# Patient Record
Sex: FEMALE | Race: WHITE | Employment: UNEMPLOYED | URBAN - NONMETROPOLITAN AREA
[De-identification: names, ages, dates, MRNs, and addresses within clinical notes are randomized per-mention and may not be internally consistent; named-entity substitution may affect disease eponyms.]

---

## 2017-10-31 ENCOUNTER — OFFICE VISIT (OUTPATIENT)
Dept: GASTROENTEROLOGY | Age: 57
End: 2017-10-31
Payer: MEDICAID

## 2017-10-31 VITALS
HEART RATE: 95 BPM | OXYGEN SATURATION: 98 % | HEIGHT: 69 IN | WEIGHT: 182.8 LBS | DIASTOLIC BLOOD PRESSURE: 70 MMHG | BODY MASS INDEX: 27.08 KG/M2 | SYSTOLIC BLOOD PRESSURE: 125 MMHG

## 2017-10-31 DIAGNOSIS — B18.2 CHRONIC HEPATITIS C WITHOUT HEPATIC COMA (HCC): Primary | ICD-10-CM

## 2017-10-31 PROCEDURE — G8482 FLU IMMUNIZE ORDER/ADMIN: HCPCS | Performed by: INTERNAL MEDICINE

## 2017-10-31 PROCEDURE — 1036F TOBACCO NON-USER: CPT | Performed by: INTERNAL MEDICINE

## 2017-10-31 PROCEDURE — 3017F COLORECTAL CA SCREEN DOC REV: CPT | Performed by: INTERNAL MEDICINE

## 2017-10-31 PROCEDURE — G8419 CALC BMI OUT NRM PARAM NOF/U: HCPCS | Performed by: INTERNAL MEDICINE

## 2017-10-31 PROCEDURE — 99213 OFFICE O/P EST LOW 20 MIN: CPT | Performed by: INTERNAL MEDICINE

## 2017-10-31 PROCEDURE — G8427 DOCREV CUR MEDS BY ELIG CLIN: HCPCS | Performed by: INTERNAL MEDICINE

## 2017-10-31 PROCEDURE — 3014F SCREEN MAMMO DOC REV: CPT | Performed by: INTERNAL MEDICINE

## 2017-10-31 RX ORDER — ALPRAZOLAM 0.5 MG/1
TABLET ORAL
COMMUNITY
Start: 2017-10-23 | End: 2019-10-04

## 2017-10-31 RX ORDER — GABAPENTIN 300 MG/1
CAPSULE ORAL
COMMUNITY
Start: 2017-10-04 | End: 2019-10-04

## 2017-10-31 RX ORDER — PHENOL 1.4 %
1 AEROSOL, SPRAY (ML) MUCOUS MEMBRANE DAILY
COMMUNITY
End: 2021-08-31 | Stop reason: CLARIF

## 2017-10-31 RX ORDER — OMEGA-3S/DHA/EPA/FISH OIL/D3 300MG-1000
400 CAPSULE ORAL DAILY
COMMUNITY
End: 2019-10-04

## 2017-10-31 ASSESSMENT — ENCOUNTER SYMPTOMS
NAUSEA: 0
VOMITING: 0
CONSTIPATION: 0
ABDOMINAL PAIN: 0
GASTROINTESTINAL NEGATIVE: 1
RECTAL PAIN: 0
ABDOMINAL DISTENTION: 0
ANAL BLEEDING: 0
COUGH: 0
RESPIRATORY NEGATIVE: 1
BLOOD IN STOOL: 0
TROUBLE SWALLOWING: 0
DIARRHEA: 0

## 2017-10-31 NOTE — PROGRESS NOTES
tablet, Take 10 mg by mouth 3 times daily as needed for Muscle spasms, Disp: , Rfl:     Other and Phenergan [promethazine hcl]    Family History   Problem Relation Age of Onset    Diabetes Maternal Grandmother     Cancer Maternal Grandmother     Colon Cancer Neg Hx     Colon Polyps Neg Hx     Esophageal Cancer Neg Hx     Liver Cancer Neg Hx     Liver Disease Neg Hx     Rectal Cancer Neg Hx     Stomach Cancer Neg Hx        Social History     Social History    Marital status:      Spouse name: N/A    Number of children: N/A    Years of education: N/A     Occupational History    Not on file. Social History Main Topics    Smoking status: Former Smoker     Quit date: 10/1/2010    Smokeless tobacco: Never Used    Alcohol use Yes      Comment: social    Drug use: No    Sexual activity: Not on file     Other Topics Concern    Not on file     Social History Narrative    No narrative on file         Review of Systems   Constitutional: Negative. Negative for chills, fever and unexpected weight change. HENT: Negative for trouble swallowing. Respiratory: Negative. Negative for cough. Cardiovascular: Negative. Negative for chest pain and palpitations. Gastrointestinal: Negative. Negative for abdominal distention, abdominal pain, anal bleeding, blood in stool, constipation, diarrhea, nausea, rectal pain and vomiting. Skin: Negative. Negative for rash. Allergic/Immunologic: Negative for food allergies. Neurological: Negative for seizures. Psychiatric/Behavioral: Negative. The patient is not nervous/anxious. Objective:   Physical Exam   Constitutional: She is oriented to person, place, and time. She appears well-developed and well-nourished. HENT:   Head: Normocephalic and atraumatic. Eyes: Pupils are equal, round, and reactive to light. No scleral icterus. Neck: Normal range of motion. Neck supple.    Cardiovascular: Normal rate, regular rhythm and normal heart

## 2017-11-02 ENCOUNTER — TELEPHONE (OUTPATIENT)
Dept: GASTROENTEROLOGY | Age: 57
End: 2017-11-02

## 2017-11-02 DIAGNOSIS — B18.2 CHRONIC HEPATITIS C WITHOUT HEPATIC COMA (HCC): Primary | ICD-10-CM

## 2017-11-02 NOTE — TELEPHONE ENCOUNTER
I called pt to ask if she had a certain day and time she would like when I called to schedule her liver u/s.   I left a message on her answering machine to call me back when she could

## 2017-11-08 NOTE — TELEPHONE ENCOUNTER
Wai Wood is returning a call to L. Frankey Anis please call her at [phone]. This is regarding scheduling her Liver US.  Chemo warren

## 2017-11-09 ENCOUNTER — TELEPHONE (OUTPATIENT)
Dept: GASTROENTEROLOGY | Age: 57
End: 2017-11-09

## 2017-11-09 NOTE — TELEPHONE ENCOUNTER
11/9/17 Patient is scheduled for the  Liver on Friday 11/17/17 @ 9:30 at [facility].

## 2017-12-08 ENCOUNTER — TELEPHONE (OUTPATIENT)
Dept: GASTROENTEROLOGY | Age: 57
End: 2017-12-08

## 2017-12-19 ENCOUNTER — TELEPHONE (OUTPATIENT)
Dept: GASTROENTEROLOGY | Age: 57
End: 2017-12-19

## 2017-12-19 NOTE — TELEPHONE ENCOUNTER
Message for Norbert    12-19-17 1:45  Carmen Rollins with [facility] called asking for a return call [phone] to discuss an appeal on the patient's Hepatitis C medication.   kelvin

## 2017-12-26 ENCOUNTER — TELEPHONE (OUTPATIENT)
Dept: GASTROENTEROLOGY | Age: 57
End: 2017-12-26

## 2018-01-03 ENCOUNTER — TELEPHONE (OUTPATIENT)
Dept: GASTROENTEROLOGY | Age: 58
End: 2018-01-03

## 2018-01-11 ENCOUNTER — TELEPHONE (OUTPATIENT)
Dept: GASTROENTEROLOGY | Age: 58
End: 2018-01-11

## 2018-01-22 ENCOUNTER — TELEPHONE (OUTPATIENT)
Dept: GASTROENTEROLOGY | Age: 58
End: 2018-01-22

## 2018-02-08 ENCOUNTER — TELEPHONE (OUTPATIENT)
Dept: GASTROENTEROLOGY | Age: 58
End: 2018-02-08

## 2019-10-04 ENCOUNTER — OFFICE VISIT (OUTPATIENT)
Dept: GASTROENTEROLOGY | Age: 59
End: 2019-10-04

## 2019-10-04 VITALS
HEART RATE: 73 BPM | SYSTOLIC BLOOD PRESSURE: 138 MMHG | HEIGHT: 69 IN | BODY MASS INDEX: 23.67 KG/M2 | DIASTOLIC BLOOD PRESSURE: 76 MMHG | WEIGHT: 159.8 LBS | OXYGEN SATURATION: 98 %

## 2019-10-04 DIAGNOSIS — Z12.11 COLON CANCER SCREENING: Primary | ICD-10-CM

## 2019-10-04 PROCEDURE — 99999 PR OFFICE/OUTPT VISIT,PROCEDURE ONLY: CPT | Performed by: NURSE PRACTITIONER

## 2019-10-04 RX ORDER — ONDANSETRON 4 MG/1
4 TABLET, ORALLY DISINTEGRATING ORAL PRN
COMMUNITY
End: 2021-08-31 | Stop reason: CLARIF

## 2019-10-04 RX ORDER — LAMOTRIGINE 200 MG/1
100 TABLET ORAL 2 TIMES DAILY
COMMUNITY

## 2019-10-04 RX ORDER — M-VIT,TX,IRON,MINS/CALC/FOLIC 27MG-0.4MG
1 TABLET ORAL DAILY
COMMUNITY
End: 2021-08-31 | Stop reason: CLARIF

## 2019-10-04 RX ORDER — VERAPAMIL HYDROCHLORIDE 300 MG/1
300 CAPSULE, EXTENDED RELEASE ORAL DAILY
COMMUNITY
End: 2021-08-31 | Stop reason: CLARIF

## 2019-10-04 ASSESSMENT — ENCOUNTER SYMPTOMS
CHOKING: 0
ABDOMINAL PAIN: 1
COUGH: 0
TROUBLE SWALLOWING: 0
NAUSEA: 1
SHORTNESS OF BREATH: 0
BLOOD IN STOOL: 0
CONSTIPATION: 0
ANAL BLEEDING: 0
RECTAL PAIN: 0
ABDOMINAL DISTENTION: 0
DIARRHEA: 1
VOMITING: 0

## 2019-10-18 ENCOUNTER — HOSPITAL ENCOUNTER (OUTPATIENT)
Age: 59
Setting detail: OUTPATIENT SURGERY
Discharge: HOME | End: 2019-10-18
Attending: INTERNAL MEDICINE | Admitting: INTERNAL MEDICINE
Payer: MEDICAID

## 2019-10-18 ENCOUNTER — APPOINTMENT (OUTPATIENT)
Dept: OPERATING ROOM | Age: 59
End: 2019-10-18

## 2019-10-18 ENCOUNTER — ANESTHESIA (OUTPATIENT)
Dept: OPERATING ROOM | Age: 59
End: 2019-10-18

## 2019-10-18 ENCOUNTER — ANESTHESIA EVENT (OUTPATIENT)
Dept: OPERATING ROOM | Age: 59
End: 2019-10-18

## 2019-10-18 VITALS
WEIGHT: 157 LBS | OXYGEN SATURATION: 98 % | SYSTOLIC BLOOD PRESSURE: 112 MMHG | RESPIRATION RATE: 16 BRPM | DIASTOLIC BLOOD PRESSURE: 65 MMHG | BODY MASS INDEX: 23.25 KG/M2 | HEIGHT: 69 IN | HEART RATE: 78 BPM

## 2019-10-18 VITALS — SYSTOLIC BLOOD PRESSURE: 100 MMHG | OXYGEN SATURATION: 96 % | DIASTOLIC BLOOD PRESSURE: 53 MMHG

## 2019-10-18 PROCEDURE — G0121 COLON CA SCRN NOT HI RSK IND: HCPCS

## 2019-10-18 PROCEDURE — G8907 PT DOC NO EVENTS ON DISCHARG: HCPCS

## 2019-10-18 PROCEDURE — G8918 PT W/O PREOP ORDER IV AB PRO: HCPCS

## 2019-10-18 PROCEDURE — 45378 DIAGNOSTIC COLONOSCOPY: CPT | Performed by: INTERNAL MEDICINE

## 2019-10-18 RX ORDER — SODIUM CHLORIDE 9 MG/ML
INJECTION, SOLUTION INTRAVENOUS CONTINUOUS
Status: DISCONTINUED | OUTPATIENT
Start: 2019-10-18 | End: 2019-10-18 | Stop reason: HOSPADM

## 2019-10-18 RX ORDER — PROPOFOL 10 MG/ML
INJECTION, EMULSION INTRAVENOUS PRN
Status: DISCONTINUED | OUTPATIENT
Start: 2019-10-18 | End: 2019-10-18 | Stop reason: SDUPTHER

## 2019-10-18 RX ORDER — LIDOCAINE HYDROCHLORIDE 10 MG/ML
INJECTION, SOLUTION INFILTRATION; PERINEURAL PRN
Status: DISCONTINUED | OUTPATIENT
Start: 2019-10-18 | End: 2019-10-18 | Stop reason: SDUPTHER

## 2019-10-18 RX ORDER — SODIUM CHLORIDE 9 MG/ML
INJECTION, SOLUTION INTRAVENOUS CONTINUOUS PRN
Status: DISCONTINUED | OUTPATIENT
Start: 2019-10-18 | End: 2019-10-18 | Stop reason: SDUPTHER

## 2019-10-18 RX ADMIN — SODIUM CHLORIDE: 9 INJECTION, SOLUTION INTRAVENOUS at 15:37

## 2019-10-18 RX ADMIN — PROPOFOL 250 MG: 10 INJECTION, EMULSION INTRAVENOUS at 15:41

## 2019-10-18 RX ADMIN — SODIUM CHLORIDE: 9 INJECTION, SOLUTION INTRAVENOUS at 14:26

## 2019-10-18 RX ADMIN — LIDOCAINE HYDROCHLORIDE 40 MG: 10 INJECTION, SOLUTION INFILTRATION; PERINEURAL at 15:41

## 2019-10-18 ASSESSMENT — PAIN SCALES - GENERAL
PAINLEVEL_OUTOF10: 0
PAINLEVEL_OUTOF10: 0

## 2021-08-04 ENCOUNTER — TELEPHONE (OUTPATIENT)
Dept: NEUROLOGY | Age: 61
End: 2021-08-04

## 2021-08-31 ENCOUNTER — TELEPHONE (OUTPATIENT)
Dept: NEUROLOGY | Age: 61
End: 2021-08-31

## 2021-08-31 ENCOUNTER — OFFICE VISIT (OUTPATIENT)
Dept: NEUROLOGY | Age: 61
End: 2021-08-31
Payer: MEDICAID

## 2021-08-31 VITALS
SYSTOLIC BLOOD PRESSURE: 134 MMHG | HEIGHT: 68 IN | DIASTOLIC BLOOD PRESSURE: 81 MMHG | BODY MASS INDEX: 21.37 KG/M2 | WEIGHT: 141 LBS | HEART RATE: 103 BPM

## 2021-08-31 DIAGNOSIS — M54.2 PAIN, NECK: ICD-10-CM

## 2021-08-31 DIAGNOSIS — R20.0 NUMBNESS: Primary | ICD-10-CM

## 2021-08-31 DIAGNOSIS — M79.661 PAIN IN BOTH LOWER LEGS: ICD-10-CM

## 2021-08-31 DIAGNOSIS — M79.662 PAIN IN BOTH LOWER LEGS: ICD-10-CM

## 2021-08-31 PROCEDURE — 3017F COLORECTAL CA SCREEN DOC REV: CPT | Performed by: PSYCHIATRY & NEUROLOGY

## 2021-08-31 PROCEDURE — G8420 CALC BMI NORM PARAMETERS: HCPCS | Performed by: PSYCHIATRY & NEUROLOGY

## 2021-08-31 PROCEDURE — 99204 OFFICE O/P NEW MOD 45 MIN: CPT | Performed by: PSYCHIATRY & NEUROLOGY

## 2021-08-31 PROCEDURE — G8427 DOCREV CUR MEDS BY ELIG CLIN: HCPCS | Performed by: PSYCHIATRY & NEUROLOGY

## 2021-08-31 PROCEDURE — 1036F TOBACCO NON-USER: CPT | Performed by: PSYCHIATRY & NEUROLOGY

## 2021-08-31 RX ORDER — SENNOSIDES 8.6 MG
650 CAPSULE ORAL EVERY 8 HOURS PRN
COMMUNITY

## 2021-08-31 RX ORDER — GLUCOSAMINE/D3/BOSWELLIA SERRA 1500MG-400
1 TABLET ORAL DAILY
COMMUNITY

## 2021-08-31 RX ORDER — CETIRIZINE HYDROCHLORIDE 10 MG/1
1 TABLET ORAL DAILY
COMMUNITY
Start: 2021-08-09

## 2021-08-31 RX ORDER — LOSARTAN POTASSIUM 50 MG/1
1 TABLET ORAL DAILY
COMMUNITY
Start: 2021-08-15

## 2021-08-31 RX ORDER — AMLODIPINE BESYLATE 5 MG/1
1 TABLET ORAL DAILY
COMMUNITY
Start: 2021-06-13

## 2021-08-31 NOTE — TELEPHONE ENCOUNTER
Called spoke with patient about her nerve conduction test, patient is aware of the appointment  And location.

## 2021-08-31 NOTE — PROGRESS NOTES
Chief Complaint   Patient presents with    Consultation     New patient referral from [address] [place] [address] to evaluate patient with numbness and tingling in bilat upper extremities, right lower extremity, and intermittently on the left lower extremity. Patient had MRI's completed at [facility], she brought that disc for review today. Brynn Calhoun is a 59y.o. year old female who is seen for evaluation of numbness in the arms and pain in the legs The patient indicates that over the last one year she has notice numbness in her medial forearm and last finger, first in the left hand and then in the right. She denies any pain or weakness in the arms. She feels this has moved up her arm. She does have some mild neck pain but nothing significant. She denies any neck injuries or motor vehicle accidents. She has some chronic numbness over the right lateral thigh. Over the last few months she has had some pain in the mid to lower thigh primarily on the right, often if she stands or walks. She is unclear how much her knee is involved but moving her knee does not cause her pain. She does have slow back pain. She denies any bowel or bladder incontinence or gait abnormalities. She had an MRI of the C spine which revealed multilevel degenerative changes with mass effect on the cord and thecal sac with no cord abnormalities. The MRI of the lumbarspine had only minimal degenerative changes.     Active Ambulatory Problems     Diagnosis Date Noted    Hepatitis C without hepatic coma 07/06/2015     Resolved Ambulatory Problems     Diagnosis Date Noted    No Resolved Ambulatory Problems     Past Medical History:   Diagnosis Date    Depression     Hepatitis C 2006    HTN (hypertension)     Osteoarthritis        Past Surgical History:   Procedure Laterality Date    CERVIX SURGERY      CHOLECYSTECTOMY, LAPAROSCOPIC      COLONOSCOPY  2008    PER PT \" NO POLYPS\"    COLONOSCOPY N/A 10/18/2019    Dr Loyda Pires, 10 y recall  FOOT SURGERY      right    LIVER BIOPSY  08/11/2015    DR VILLAVICENCIO:  SCHEUER'S SCORE INFLAMMATION EQUALS 1, SCHEUER'S STAGE FIBROSIS EQUALS 0, NEGATIVE FOR INCREASED STAINABLE IRON, NEGATIVE FOR EVIDENCE OF MALIGNANCY OR CIRRHOSIS.  UPPER GASTROINTESTINAL ENDOSCOPY  2016    Reba Maxim, normal per pt       Family History   Problem Relation Age of Onset    Diabetes Maternal Grandmother     Cancer Maternal Grandmother     Stomach Cancer Brother     Colon Cancer Neg Hx     Colon Polyps Neg Hx     Esophageal Cancer Neg Hx     Liver Cancer Neg Hx     Liver Disease Neg Hx     Rectal Cancer Neg Hx        Allergies   Allergen Reactions    Other      Antibotic name unknown to patient    Phenergan [Promethazine Hcl]        Social History     Socioeconomic History    Marital status:      Spouse name: Not on file    Number of children: Not on file    Years of education: Not on file    Highest education level: Not on file   Occupational History    Not on file   Tobacco Use    Smoking status: Former Smoker     Quit date: 10/1/2010     Years since quitting: 10.9    Smokeless tobacco: Never Used   Vaping Use    Vaping Use: Never used   Substance and Sexual Activity    Alcohol use: Yes     Comment: social    Drug use: No    Sexual activity: Not on file   Other Topics Concern    Not on file   Social History Narrative    Not on file     Social Determinants of Health     Financial Resource Strain:     Difficulty of Paying Living Expenses:    Food Insecurity:     Worried About Running Out of Food in the Last Year:     Ran Out of Food in the Last Year:    Transportation Needs:     Lack of Transportation (Medical):      Lack of Transportation (Non-Medical):    Physical Activity:     Days of Exercise per Week:     Minutes of Exercise per Session:    Stress:     Feeling of Stress :    Social Connections:     Frequency of Communication with Friends and Family:     Frequency of Social No scars, masses, or lesions over external nose or ears, no atrophy of tongue  Neck-symmetric, no masses noted, no jugular vein distension  Respiration- chest wall appears symmetric, good expansion,   normal effort without use of accessory muscles  Musculoskeletal - no significant wasting of muscles noted, no bony deformities  Extremities-no clubbing, cyanosis or edema  Skin - warm, dry, and intact. No rash, erythema, or pallor.   Psychiatric - mood, affect, and behavior appear normal.      Neurological exam  Awake, alert, fluent oriented x 3 appropriate affect  Attention and concentration appear appropriate  Recent and remote memory appears unremarkable  Speech normal without dysarthria  No clear issues with language of fund of knowledge    Cranial Nerve Exam   CN II- Visual fields grossly unremarkable  CN III, IV,VI-EOMI, No nystagmus, conjugate eye movements, no ptosis  CN V-sensation intact to LT over face  CN VII-no facial assymetry  CN VIII-Hearing intact to finger rub  CN IX and X- Palate not tested  CN XI-not test shoulder shrug  CN XII-Tongue midline with no fasciculations or fibrillations    Motor Exam  V/V throughout upper and lower extremities bilaterally, no cogwheeling, normal tone    Sensory Exam  Sensation intact to light touch and temperature upper and lower extremities bilaterally  Except right lateral thigh and last finger or both hands    Reflexes   2+ biceps bilaterally  2+ brachioradialis  2+patella  0 ankle jerks  No clonus ankles  No Montejo's sign bilateral hands    Tremors- no tremors in hands or head noted    Gait  Normal base and speed  No ataxia    Coordination  Finger to nose-unremarkable    No results found for: [id number]  Lab Results   Component Value Date    WBC 6.7 10/31/2017    HGB 12.5 10/31/2017    HCT 37.1 10/31/2017    MCV 84 10/31/2017     10/31/2017     Lab Results   Component Value Date     10/31/2017    K 4.1 10/31/2017     10/31/2017    CO2 23 10/31/2017    BUN 15 10/31/2017    CREATININE 0.78 10/31/2017    GLUCOSE 96 10/31/2017    CALCIUM 9.2 10/31/2017    PROT 7.0 10/31/2017    LABALBU 4.6 10/31/2017    BILITOT 0.3 10/31/2017    BILITOT 0.4 10/31/2017    ALKPHOS 99 10/31/2017    AST 23 10/31/2017    ALT 29 10/31/2017    ALT 25 10/31/2017           Assessment    ICD-10-CM    1. Numbness  R20.0 [id number] [facility], [place]     Nerve Conduction Test with EMG   2. Pain in both lower legs  M79.661 [facility]    E59.848 Nerve Conduction Test with EMG   3. 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 She has some subjective altered sensation over the last finger of both hands and possibly medial forearms. She has no evidence of weakness, hyperreflexia or pathological reflexes. Her gait was unremarkable. Based upon her history and examination it is unclear what role her cervical MRI findings are related to her symptoms. She has no clear evidence of a myelopathy. The sensory abnormalities in the hands could be suggestive of ulnar neuropathies. Her discomfort in her right anterior through could result from a neuralgia from the sensory branch of the femoral nerve or some musculoskeletal etiology. At this time she will be scheduled for an EMG with nerve conduction study of the right leg and arms. She will also be referred to our neurosurgical group for evaluation. The patient indicated understanding of the management plan. She is to follow up with me at the time of the nerve study to determine what further management is warranted. Plan    Orders Placed This Encounter   Procedures   [facility], [place]    Nerve Conduction Test with EMG       No orders of the defined types were placed in this encounter. Return if symptoms worsen or fail to improve.       EMR Dragon/transcription disclaimer:Significant part of this  encounter note is electronic transcription/translation of spoken language to printed text. The electronic translation of spoken language may be erroneous, or at times, nonsensical words or phrases may be inadvertently transcribed.  Although I have reviewed the note for such errors, some may still exist.

## 2021-09-02 ENCOUNTER — TELEPHONE (OUTPATIENT)
Dept: NEUROSURGERY | Age: 61
End: 2021-09-02

## 2021-09-02 NOTE — TELEPHONE ENCOUNTER
1st attempt to call patient to schedule appointment left voicemail with call back number [phone]

## 2021-09-07 ENCOUNTER — TELEPHONE (OUTPATIENT)
Dept: NEUROSURGERY | Age: 61
End: 2021-09-07

## 2021-09-07 NOTE — TELEPHONE ENCOUNTER
2nd attempt to call patient to schedule appointment, patient stated her mother just passed away 9-6-21 and will call back to schedule appointment at another time

## 2021-10-11 ENCOUNTER — HOSPITAL ENCOUNTER (OUTPATIENT)
Dept: NEUROLOGY | Age: 61
Discharge: HOME | End: 2021-10-11
Payer: MEDICAID

## 2021-10-11 DIAGNOSIS — M79.661 PAIN IN BOTH LOWER LEGS: ICD-10-CM

## 2021-10-11 DIAGNOSIS — M79.662 PAIN IN BOTH LOWER LEGS: ICD-10-CM

## 2021-10-11 DIAGNOSIS — R20.0 NUMBNESS: ICD-10-CM

## 2021-10-11 PROCEDURE — 95910 NRV CNDJ TEST 7-8 STUDIES: CPT | Performed by: PSYCHIATRY & NEUROLOGY

## 2021-10-11 PROCEDURE — 95886 MUSC TEST DONE W/N TEST COMP: CPT

## 2021-10-11 PROCEDURE — 95910 NRV CNDJ TEST 7-8 STUDIES: CPT

## 2021-10-11 PROCEDURE — 95886 MUSC TEST DONE W/N TEST COMP: CPT | Performed by: PSYCHIATRY & NEUROLOGY

## 2021-11-16 ENCOUNTER — TELEPHONE (OUTPATIENT)
Dept: NEUROSURGERY | Age: 61
End: 2021-11-16

## 2023-11-09 ENCOUNTER — PRE-ADMISSION TESTING (OUTPATIENT)
Dept: PREADMISSION TESTING | Facility: HOSPITAL | Age: 63
End: 2023-11-09
Payer: MEDICARE

## 2023-11-09 ENCOUNTER — HOSPITAL ENCOUNTER (OUTPATIENT)
Dept: GENERAL RADIOLOGY | Facility: HOSPITAL | Age: 63
Discharge: HOME | End: 2023-11-09
Payer: MEDICARE

## 2023-11-09 VITALS
BODY MASS INDEX: 25.56 KG/M2 | RESPIRATION RATE: 18 BRPM | DIASTOLIC BLOOD PRESSURE: 67 MMHG | OXYGEN SATURATION: 96 % | SYSTOLIC BLOOD PRESSURE: 135 MMHG | HEART RATE: 92 BPM | HEIGHT: 62 IN | WEIGHT: 138.89 LBS

## 2023-11-09 LAB
ALBUMIN SERPL-MCNC: 4.6 G/DL (ref 3.5–5.2)
ALBUMIN/GLOB SERPL: 1.8 G/DL
ALP SERPL-CCNC: 116 U/L (ref 39–117)
ALT SERPL W P-5'-P-CCNC: 24 U/L (ref 1–33)
ANION GAP SERPL CALCULATED.3IONS-SCNC: 11 MMOL/L (ref 5–15)
APTT PPP: 28.3 SECONDS (ref 24.5–36)
AST SERPL-CCNC: 30 U/L (ref 1–32)
BILIRUB SERPL-MCNC: 0.4 MG/DL (ref 0–1.2)
BILIRUB UR QL STRIP: NEGATIVE
BUN SERPL-MCNC: 19 MG/DL (ref 8–23)
BUN/CREAT SERPL: 22.1 (ref 7–25)
CALCIUM SPEC-SCNC: 9.7 MG/DL (ref 8.6–10.5)
CHLORIDE SERPL-SCNC: 102 MMOL/L (ref 98–107)
CLARITY UR: CLEAR
CO2 SERPL-SCNC: 28 MMOL/L (ref 22–29)
COLOR UR: YELLOW
CREAT SERPL-MCNC: 0.86 MG/DL (ref 0.57–1)
DEPRECATED RDW RBC AUTO: 39 FL (ref 37–54)
EGFRCR SERPLBLD CKD-EPI 2021: 75.1 ML/MIN/1.73
ERYTHROCYTE [DISTWIDTH] IN BLOOD BY AUTOMATED COUNT: 12.1 % (ref 12.3–15.4)
GLOBULIN UR ELPH-MCNC: 2.6 GM/DL
GLUCOSE SERPL-MCNC: 111 MG/DL (ref 65–99)
GLUCOSE UR STRIP-MCNC: NEGATIVE MG/DL
HCT VFR BLD AUTO: 39.5 % (ref 34–46.6)
HGB BLD-MCNC: 12.7 G/DL (ref 12–15.9)
HGB UR QL STRIP.AUTO: NEGATIVE
INR PPP: 0.88 (ref 0.91–1.09)
KETONES UR QL STRIP: NEGATIVE
LEUKOCYTE ESTERASE UR QL STRIP.AUTO: NEGATIVE
MCH RBC QN AUTO: 28.2 PG (ref 26.6–33)
MCHC RBC AUTO-ENTMCNC: 32.2 G/DL (ref 31.5–35.7)
MCV RBC AUTO: 87.8 FL (ref 79–97)
NITRITE UR QL STRIP: NEGATIVE
PH UR STRIP.AUTO: 7.5 [PH] (ref 5–8)
PLATELET # BLD AUTO: 248 10*3/MM3 (ref 140–450)
PMV BLD AUTO: 10.4 FL (ref 6–12)
POTASSIUM SERPL-SCNC: 3.8 MMOL/L (ref 3.5–5.2)
PROT SERPL-MCNC: 7.2 G/DL (ref 6–8.5)
PROT UR QL STRIP: NEGATIVE
PROTHROMBIN TIME: 12 SECONDS (ref 11.8–14.8)
QT INTERVAL: 350 MS
QTC INTERVAL: 408 MS
RBC # BLD AUTO: 4.5 10*6/MM3 (ref 3.77–5.28)
SODIUM SERPL-SCNC: 141 MMOL/L (ref 136–145)
SP GR UR STRIP: 1.01 (ref 1–1.03)
UROBILINOGEN UR QL STRIP: NORMAL
WBC NRBC COR # BLD: 6.49 10*3/MM3 (ref 3.4–10.8)

## 2023-11-09 PROCEDURE — 93005 ELECTROCARDIOGRAM TRACING: CPT

## 2023-11-09 PROCEDURE — 85027 COMPLETE CBC AUTOMATED: CPT

## 2023-11-09 PROCEDURE — 81003 URINALYSIS AUTO W/O SCOPE: CPT

## 2023-11-09 PROCEDURE — 80053 COMPREHEN METABOLIC PANEL: CPT

## 2023-11-09 PROCEDURE — 71045 X-RAY EXAM CHEST 1 VIEW: CPT

## 2023-11-09 PROCEDURE — 85610 PROTHROMBIN TIME: CPT

## 2023-11-09 PROCEDURE — 85730 THROMBOPLASTIN TIME PARTIAL: CPT

## 2023-11-09 PROCEDURE — 36415 COLL VENOUS BLD VENIPUNCTURE: CPT

## 2023-11-09 RX ORDER — CETIRIZINE HYDROCHLORIDE 10 MG/1
10 TABLET ORAL DAILY
COMMUNITY

## 2023-11-09 RX ORDER — AMLODIPINE BESYLATE 5 MG/1
5 TABLET ORAL DAILY
COMMUNITY

## 2023-11-09 RX ORDER — GLUCOSAMINE/D3/BOSWELLIA SERRA 1500MG-400
1 TABLET ORAL DAILY
COMMUNITY

## 2023-11-09 RX ORDER — LAMOTRIGINE 200 MG/1
200 TABLET ORAL DAILY
COMMUNITY

## 2023-11-09 RX ORDER — MELOXICAM 15 MG/1
15 TABLET ORAL DAILY
COMMUNITY

## 2023-11-09 RX ORDER — LOSARTAN POTASSIUM 50 MG/1
50 TABLET ORAL DAILY
COMMUNITY

## 2023-11-09 RX ORDER — ROPINIROLE 0.5 MG/1
0.5 TABLET, FILM COATED ORAL NIGHTLY
COMMUNITY

## 2023-11-09 NOTE — DISCHARGE INSTRUCTIONS
Before you come to the hospital        Arrival time: AS DIRECTED BY OFFICE     YOU MAY TAKE THE FOLLOWING MEDICATION(S) THE MORNING OF SURGERY WITH A SIP OF WATER:    STOP LOSARTAN 24 HRS PRIOR TO SURGERY           ALL OTHER HOME MEDICATION CHECK WITH YOUR PHYSICIAN (especially if   you are taking diabetes medicines or blood thinners)        If you were given and instructed to use a germ- killing soap, use as directed the night before surgery and again the morning of surgery or as directed by your surgeon. (Use one-half of the bottle with each shower.)   See attached information for How to Use Chlorhexidine for Bathing if applicable.            Eating and drinking restrictions prior to scheduled arrival time    2 Hours before arrival time STOP   Drinking Clear liquids (water, black coffee-NO CREAM,  apple juice-no pulp)    Clear Liquids    Water and flavored water                                                                      Clear Fruit juices, such as cranberry juice and apple juice.  Black coffee (NO cream of any kind, including powdered).  Plain tea  Clear bouillon or broth.  Flavored gelatin.  Soda.  Gatorade or Powerade.    8 Hours before arrival time STOP   All food, full liquids, and dairy products  Full liquid examples  Juices that have pulp.  Frozen ice pops that contain fruit pieces.  Coffee with creamer  Milk.  Yogurt.    (It is extremely important that you follow these guidelines to prevent delay or cancelation of your procedure)                       MANAGING PAIN AFTER SURGERY    We know you are probably wondering what your pain will be like after surgery.  Following surgery it is unrealistic to expect you will not have pain.   Pain is how our bodies let us know that something is wrong or cautions us to be careful.  That said, our goal is to make your pain tolerable.    Methods we may use to treat your pain include (oral or IV medications, PCAs, epidurals, nerve blocks, etc.)   While some  procedures require IV pain medications for a short time after surgery, transitioning to pain medications by mouth allows for better management of pain.   Your nurse will encourage you to take oral pain medications whenever possible.  IV medications work almost immediately, but only last a short while.  Taking medications by mouth allows for a more constant level of medication in your blood stream for a longer period of time.      Once your pain is out of control it is harder to get back under control.  It is important you are aware when your next dose of pain medication is due.  If you are admitted, your nurse may write the time of your next dose on the white board in your room to help you remember.      We are interested in your pain and encourage you to inform us about aggravating factors during your visit.   Many times a simple repositioning every few hours can make a big difference.    If your physician says it is okay, do not let your pain prevent you from getting out of bed. Be sure to call your nurse for assistance prior to getting up so you do not fall.      Before surgery, please decide your tolerable pain goal.  These faces help describe the pain ratings we use on a 0-10 scale.   Be prepared to tell us your goal and whether or not you take pain or anxiety medications at home.          Preparing for Surgery  Preparing for surgery is an important part of your care. It can make things go more smoothly and help you avoid complications. The steps leading up to surgery may vary among hospitals. Follow all instructions given to you by your health care providers. Ask questions if you do not understand something. Talk about any concerns that you have.  Here are some questions to consider asking before your surgery:  If my surgery is not an emergency (is elective), when would be the best time to have the surgery?  What arrangements do I need to make for work, home, or school?  What will my recovery be like? How long  will it be before I can return to normal activities?  Will I need to prepare my home? Will I need to arrange care for me or my children?  Should I expect to have pain after surgery? What are my pain management options? Are there nonmedical options that I can try for pain?  Tell a health care provider about:  Any allergies you have.  All medicines you are taking, including vitamins, herbs, eye drops, creams, and over-the-counter medicines.  Any problems you or family members have had with anesthetic medicines.  Any blood disorders you have.  Any surgeries you have had.  Any medical conditions you have.  Whether you are pregnant or may be pregnant.  What are the risks?  The risks and complications of surgery depend on the specific procedure that you have. Discuss all the risks with your health care providers before your surgery. Ask about common surgical complications, which may include:  Infection.  Bleeding or a need for blood replacement (transfusion).  Allergic reactions to medicines.  Damage to surrounding nerves, tissues, or structures.  A blood clot.  Scarring.  Failure of the surgery to correct the problem.  Follow these instructions before the procedure:  Several days or weeks before your procedure  You may have a physical exam by your primary health care provider to make sure it is safe for you to have surgery.  You may have testing. This may include a chest X-ray, blood and urine tests, electrocardiogram (ECG), or other testing.  Ask your health care provider about:  Changing or stopping your regular medicines. This is especially important if you are taking diabetes medicines or blood thinners.  Taking medicines such as aspirin and ibuprofen. These medicines can thin your blood. Do not take these medicines unless your health care provider tells you to take them.  Taking over-the-counter medicines, vitamins, herbs, and supplements.  Do not use any products that contain nicotine or tobacco, such as cigarettes  and e-cigarettes. If you need help quitting, ask your health care provider.  Avoid alcohol.  Ask your health care provider if there are exercises you can do to prepare for surgery.  Eat a healthy diet.   Plan to have someone 18 years of age or older to take you home from the hospital. We will need to verify your ride on the morning of surgery if you are being discharged home on the same day. Tell your ride to be expecting a call from the hospital prior to your procedure.   Plan to have a responsible adult care for you for at least 24 hours after you leave the hospital or clinic. This is important.  The day before your procedure  You may be given antibiotic medicine to take by mouth to help prevent infection. Take it as told by your health care provider.  You may be asked to shower with a germ-killing soap.  Follow instructions from your health care provider about eating and drinking restrictions. This includes gum, mints and hard candy.  Pack comfortable clothes according to your procedure.   The day of your procedure  You may need to take another shower with a germ-killing soap before you leave home in the morning.  With a small sip of water, take only the medicines that you are told to take.  Remove all jewelry including rings.   Leave anything you consider valuable at home except hearing aids if needed.  You do not need to bring your home medications into the hospital.   Do not wear any makeup, nail polish, powder, deodorant, lotion, hair accessories, or anything on your skin or body except your clothes.  If you will be staying in the hospital, bring a case to hold your glasses, contacts, or dentures. You may also want to bring your robe and non-skid footwear.       (Do not use denture adhesives since you will be asked to remove them during  surgery).   If you wear oxygen at home, bring it with you the day of surgery.  If instructed by your health care provider, bring your sleep apnea device with you on the day  of your surgery (if this applies to you).  You may want to leave your suitcase and sleep apnea device in the car until after surgery.   Arrive at the hospital as scheduled.  Bring a friend or family member with you who can help to answer questions and be present while you meet with your health care provider.  At the hospital  When you arrive at the hospital:  Go to registration located at the main entrance of the hospital. You will be registered and given a beeper and a sticker sheet. Take the stickers to the Outpatient nurses desk and place in the black tray. This is to notify staff that you have arrived. Then return to the lobby to wait.   When your beeper lights up and vibrates proceed through the double doors, under the stairs, and a member of the Outpatient Surgery staff will escort you to your preoperative room.  You may have to wear compression sleeves. These help to prevent blood clots and reduce swelling in your legs.  An IV may be inserted into one of your veins.              In the operating room, you may be given one or more of the following:        A medicine to help you relax (sedative).        A medicine to numb the area (local anesthetic).        A medicine to make you fall asleep (general anesthetic).        A medicine that is injected into an area of your body to numb everything below the                      injection site (regional anesthetic).  You may be given an antibiotic through your IV to help prevent infection.  Your surgical site will be marked or identified.    Contact a health care provider if you:  Develop a fever of more than 100.4°F (38°C) or other feelings of illness during the 48 hours before your surgery.  Have symptoms that get worse.  Have questions or concerns about your surgery.  Summary  Preparing for surgery can make the procedure go more smoothly and lower your risk of complications.  Before surgery, make a list of questions and concerns to discuss with your surgeon. Ask  about the risks and possible complications.  In the days or weeks before your surgery, follow all instructions from your health care provider. You may need to stop smoking, avoid alcohol, follow eating restrictions, and change or stop your regular medicines.  Contact your surgeon if you develop a fever or other signs of illness during the few days before your surgery.  This information is not intended to replace advice given to you by your health care provider. Make sure you discuss any questions you have with your health care provider.  Document Revised: 12/21/2018 Document Reviewed: 10/23/2018  [employer] Patient Education © 2021 [employer]         How to Use Chlorhexidine Before Surgery  Chlorhexidine gluconate (CHG) is a germ-killing (antiseptic) solution that is used to clean the skin. It can get rid of the bacteria that normally live on the skin and can keep them away for about 24 hours. To clean your skin with CHG, you may be given:  A CHG solution to use in the shower or as part of a sponge bath.  A prepackaged cloth that contains CHG.  Cleaning your skin with CHG may help lower the risk for infection:  While you are staying in the intensive care unit of the hospital.  If you have a vascular access, such as a central line, to provide short-term or long-term access to your veins.  If you have a catheter to drain urine from your bladder.  If you are on a ventilator. A ventilator is a machine that helps you breathe by moving air in and out of your lungs.  After surgery.  What are the risks?  Risks of using CHG include:  A skin reaction.  Hearing loss, if CHG gets in your ears and you have a perforated eardrum.  Eye injury, if CHG gets in your eyes and is not rinsed out.  The CHG product catching fire.  Make sure that you avoid smoking and flames after applying CHG to your skin.  Do not use CHG:  If you have a chlorhexidine allergy or have previously reacted to chlorhexidine.  On babies younger than 2 months of  age.  How to use CHG solution  Use CHG only as told by your health care provider, and follow the instructions on the label.  Use the full amount of CHG as directed. Usually, this is one bottle.  During a shower    Follow these steps when using CHG solution during a shower (unless your health care provider gives you different instructions):  Start the shower.  Use your normal soap and shampoo to wash your face and hair.  Turn off the shower or move out of the shower stream.  Pour the CHG onto a clean washcloth. Do not use any type of brush or rough-edged sponge.  Starting at your neck, lather your body down to your toes. Make sure you follow these instructions:  If you will be having surgery, pay special attention to the part of your body where you will be having surgery. Scrub this area for at least 1 minute.  Do not use CHG on your head or face. If the solution gets into your ears or eyes, rinse them well with water.  Avoid your genital area.  Avoid any areas of skin that have broken skin, cuts, or scrapes.  Scrub your back and under your arms. Make sure to wash skin folds.  Let the lather sit on your skin for 1-2 minutes or as long as told by your health care provider.  Thoroughly rinse your entire body in the shower. Make sure that all body creases and crevices are rinsed well.  Dry off with a clean towel. Do not put any substances on your body afterward--such as powder, lotion, or perfume--unless you are told to do so by your health care provider. Only use lotions that are recommended by the .  Put on clean clothes or pajamas.  If it is the night before your surgery, sleep in clean sheets.     During a sponge bath  Follow these steps when using CHG solution during a sponge bath (unless your health care provider gives you different instructions):  Use your normal soap and shampoo to wash your face and hair.  Pour the CHG onto a clean washcloth.  Starting at your neck, lather your body down to your  toes. Make sure you follow these instructions:  If you will be having surgery, pay special attention to the part of your body where you will be having surgery. Scrub this area for at least 1 minute.  Do not use CHG on your head or face. If the solution gets into your ears or eyes, rinse them well with water.  Avoid your genital area.  Avoid any areas of skin that have broken skin, cuts, or scrapes.  Scrub your back and under your arms. Make sure to wash skin folds.  Let the lather sit on your skin for 1-2 minutes or as long as told by your health care provider.  Using a different clean, wet washcloth, thoroughly rinse your entire body. Make sure that all body creases and crevices are rinsed well.  Dry off with a clean towel. Do not put any substances on your body afterward--such as powder, lotion, or perfume--unless you are told to do so by your health care provider. Only use lotions that are recommended by the .  Put on clean clothes or pajamas.  If it is the night before your surgery, sleep in clean sheets.  How to use CHG prepackaged cloths  Only use CHG cloths as told by your health care provider, and follow the instructions on the label.  Use the CHG cloth on clean, dry skin.  Do not use the CHG cloth on your head or face unless your health care provider tells you to.  When washing with the CHG cloth:  Avoid your genital area.  Avoid any areas of skin that have broken skin, cuts, or scrapes.  Before surgery    Follow these steps when using a CHG cloth to clean before surgery (unless your health care provider gives you different instructions):  Using the CHG cloth, vigorously scrub the part of your body where you will be having surgery. Scrub using a back-and-forth motion for 3 minutes. The area on your body should be completely wet with CHG when you are done scrubbing.  Do not rinse. Discard the cloth and let the area air-dry. Do not put any substances on the area afterward, such as powder, lotion,  or perfume.  Put on clean clothes or pajamas.  If it is the night before your surgery, sleep in clean sheets.     For general bathing  Follow these steps when using CHG cloths for general bathing (unless your health care provider gives you different instructions).  Use a separate CHG cloth for each area of your body. Make sure you wash between any folds of skin and between your fingers and toes. Wash your body in the following order, switching to a new cloth after each step:  The front of your neck, shoulders, and chest.  Both of your arms, under your arms, and your hands.  Your stomach and groin area, avoiding the genitals.  Your right leg and foot.  Your left leg and foot.  The back of your neck, your back, and your buttocks.  Do not rinse. Discard the cloth and let the area air-dry. Do not put any substances on your body afterward--such as powder, lotion, or perfume--unless you are told to do so by your health care provider. Only use lotions that are recommended by the .  Put on clean clothes or pajamas.  Contact a health care provider if:  Your skin gets irritated after scrubbing.  You have questions about using your solution or cloth.  You swallow any chlorhexidine. Call your local poison control center (1-907.813.7557 in the [place]  Get help right away if:  Your eyes itch badly, or they become very red or swollen.  Your skin itches badly and is red or swollen.  Your hearing changes.  You have trouble seeing.  You have swelling or tingling in your mouth or throat.  You have trouble breathing.  These symptoms may represent a serious problem that is an emergency. Do not wait to see if the symptoms will go away. Get medical help right away. Call your local emergency services (206 in the [place] Do not drive yourself to the hospital.  Summary  Chlorhexidine gluconate (CHG) is a germ-killing (antiseptic) solution that is used to clean the skin. Cleaning your skin with CHG may help to lower your risk for  infection.  You may be given CHG to use for bathing. It may be in a bottle or in a prepackaged cloth to use on your skin. Carefully follow your health care provider's instructions and the instructions on the product label.  Do not use CHG if you have a chlorhexidine allergy.  Contact your health care provider if your skin gets irritated after scrubbing.  This information is not intended to replace advice given to you by your health care provider. Make sure you discuss any questions you have with your health care provider.  Document Revised: 04/17/2023 Document Reviewed: 02/28/2022  [employer] Patient Education © 2023 [employer]

## 2023-11-15 LAB
QT INTERVAL: 350 MS
QTC INTERVAL: 408 MS

## 2023-11-20 ENCOUNTER — HOSPITAL ENCOUNTER (INPATIENT)
Facility: HOSPITAL | Age: 63
LOS: 1 days | Discharge: HOME | DRG: 472 | End: 2023-11-21
Attending: ORTHOPAEDIC SURGERY | Admitting: ORTHOPAEDIC SURGERY
Payer: MEDICARE

## 2023-11-20 ENCOUNTER — ANESTHESIA (OUTPATIENT)
Dept: PERIOP | Facility: HOSPITAL | Age: 63
End: 2023-11-20
Payer: MEDICARE

## 2023-11-20 ENCOUNTER — APPOINTMENT (OUTPATIENT)
Dept: GENERAL RADIOLOGY | Facility: HOSPITAL | Age: 63
DRG: 472 | End: 2023-11-20
Payer: MEDICARE

## 2023-11-20 ENCOUNTER — ANESTHESIA EVENT (OUTPATIENT)
Dept: PERIOP | Facility: HOSPITAL | Age: 63
End: 2023-11-20
Payer: MEDICARE

## 2023-11-20 DIAGNOSIS — Z74.09 IMPAIRED MOBILITY: ICD-10-CM

## 2023-11-20 DIAGNOSIS — G95.9 CERVICAL MYELOPATHY: Primary | ICD-10-CM

## 2023-11-20 PROBLEM — I10 PRIMARY HYPERTENSION: Chronic | Status: ACTIVE | Noted: 2023-11-20

## 2023-11-20 PROBLEM — K21.9 GERD WITHOUT ESOPHAGITIS: Chronic | Status: ACTIVE | Noted: 2023-11-20

## 2023-11-20 PROBLEM — K59.03 DRUG INDUCED CONSTIPATION: Chronic | Status: ACTIVE | Noted: 2023-11-20

## 2023-11-20 LAB
ABO GROUP BLD: NORMAL
BLD GP AB SCN SERPL QL: NEGATIVE
RH BLD: POSITIVE
T&S EXPIRATION DATE: NORMAL

## 2023-11-20 PROCEDURE — 25010000002 CEFAZOLIN PER 500 MG: Performed by: ORTHOPAEDIC SURGERY

## 2023-11-20 PROCEDURE — 86900 BLOOD TYPING SEROLOGIC ABO: CPT | Performed by: ORTHOPAEDIC SURGERY

## 2023-11-20 PROCEDURE — 97165 OT EVAL LOW COMPLEX 30 MIN: CPT | Performed by: OCCUPATIONAL THERAPIST

## 2023-11-20 PROCEDURE — 25010000002 FENTANYL CITRATE (PF) 250 MCG/5ML SOLUTION: Performed by: NURSE ANESTHETIST, CERTIFIED REGISTERED

## 2023-11-20 PROCEDURE — 25010000002 ONDANSETRON PER 1 MG: Performed by: NURSE ANESTHETIST, CERTIFIED REGISTERED

## 2023-11-20 PROCEDURE — 0RB30ZZ EXCISION OF CERVICAL VERTEBRAL DISC, OPEN APPROACH: ICD-10-PCS | Performed by: ORTHOPAEDIC SURGERY

## 2023-11-20 PROCEDURE — 25010000002 MIDAZOLAM PER 1 MG: Performed by: ANESTHESIOLOGY

## 2023-11-20 PROCEDURE — 4A11X4G MONITORING OF PERIPHERAL NERVOUS ELECTRICAL ACTIVITY, INTRAOPERATIVE, EXTERNAL APPROACH: ICD-10-PCS | Performed by: ORTHOPAEDIC SURGERY

## 2023-11-20 PROCEDURE — C1713 ANCHOR/SCREW BN/BN,TIS/BN: HCPCS | Performed by: ORTHOPAEDIC SURGERY

## 2023-11-20 PROCEDURE — 86901 BLOOD TYPING SEROLOGIC RH(D): CPT | Performed by: ORTHOPAEDIC SURGERY

## 2023-11-20 PROCEDURE — 25810000003 SODIUM CHLORIDE PER 500 ML: Performed by: ORTHOPAEDIC SURGERY

## 2023-11-20 PROCEDURE — 25010000002 HYDROMORPHONE 1 MG/ML SOLUTION: Performed by: NURSE ANESTHETIST, CERTIFIED REGISTERED

## 2023-11-20 PROCEDURE — 25010000002 CEFAZOLIN 1-4 GM/50ML-% SOLUTION: Performed by: ORTHOPAEDIC SURGERY

## 2023-11-20 PROCEDURE — 25810000003 SODIUM CHLORIDE 0.9 % SOLUTION: Performed by: ORTHOPAEDIC SURGERY

## 2023-11-20 PROCEDURE — 72040 X-RAY EXAM NECK SPINE 2-3 VW: CPT

## 2023-11-20 PROCEDURE — 25010000002 ONDANSETRON PER 1 MG: Performed by: ORTHOPAEDIC SURGERY

## 2023-11-20 PROCEDURE — 76000 FLUOROSCOPY <1 HR PHYS/QHP: CPT

## 2023-11-20 PROCEDURE — 25010000002 PROPOFOL 10 MG/ML EMULSION: Performed by: NURSE ANESTHETIST, CERTIFIED REGISTERED

## 2023-11-20 PROCEDURE — 0RG20A0 FUSION OF 2 OR MORE CERVICAL VERTEBRAL JOINTS WITH INTERBODY FUSION DEVICE, ANTERIOR APPROACH, ANTERIOR COLUMN, OPEN APPROACH: ICD-10-PCS | Performed by: ORTHOPAEDIC SURGERY

## 2023-11-20 PROCEDURE — 86850 RBC ANTIBODY SCREEN: CPT | Performed by: ORTHOPAEDIC SURGERY

## 2023-11-20 PROCEDURE — 25810000003 LACTATED RINGERS PER 1000 ML: Performed by: ORTHOPAEDIC SURGERY

## 2023-11-20 PROCEDURE — 25010000002 DEXAMETHASONE PER 1 MG: Performed by: NURSE ANESTHETIST, CERTIFIED REGISTERED

## 2023-11-20 RX ORDER — DEXAMETHASONE SODIUM PHOSPHATE 4 MG/ML
INJECTION, SOLUTION INTRA-ARTICULAR; INTRALESIONAL; INTRAMUSCULAR; INTRAVENOUS; SOFT TISSUE AS NEEDED
Status: DISCONTINUED | OUTPATIENT
Start: 2023-11-20 | End: 2023-11-20 | Stop reason: SURG

## 2023-11-20 RX ORDER — FENTANYL CITRATE 50 UG/ML
INJECTION, SOLUTION INTRAMUSCULAR; INTRAVENOUS AS NEEDED
Status: DISCONTINUED | OUTPATIENT
Start: 2023-11-20 | End: 2023-11-20 | Stop reason: SURG

## 2023-11-20 RX ORDER — SODIUM CHLORIDE 9 MG/ML
40 INJECTION, SOLUTION INTRAVENOUS AS NEEDED
Status: DISCONTINUED | OUTPATIENT
Start: 2023-11-20 | End: 2023-11-20 | Stop reason: HOSPADM

## 2023-11-20 RX ORDER — FENTANYL CITRATE 50 UG/ML
25 INJECTION, SOLUTION INTRAMUSCULAR; INTRAVENOUS
Status: DISCONTINUED | OUTPATIENT
Start: 2023-11-20 | End: 2023-11-20 | Stop reason: HOSPADM

## 2023-11-20 RX ORDER — DROPERIDOL 2.5 MG/ML
0.62 INJECTION, SOLUTION INTRAMUSCULAR; INTRAVENOUS ONCE AS NEEDED
Status: DISCONTINUED | OUTPATIENT
Start: 2023-11-20 | End: 2023-11-20 | Stop reason: HOSPADM

## 2023-11-20 RX ORDER — HYDROMORPHONE HYDROCHLORIDE 1 MG/ML
0.5 INJECTION, SOLUTION INTRAMUSCULAR; INTRAVENOUS; SUBCUTANEOUS
Status: DISCONTINUED | OUTPATIENT
Start: 2023-11-20 | End: 2023-11-21

## 2023-11-20 RX ORDER — NALOXONE HCL 0.4 MG/ML
0.4 VIAL (ML) INJECTION
Status: DISCONTINUED | OUTPATIENT
Start: 2023-11-20 | End: 2023-11-21 | Stop reason: HOSPADM

## 2023-11-20 RX ORDER — SODIUM CHLORIDE 0.9 % (FLUSH) 0.9 %
3 SYRINGE (ML) INJECTION EVERY 12 HOURS SCHEDULED
Status: DISCONTINUED | OUTPATIENT
Start: 2023-11-20 | End: 2023-11-21 | Stop reason: HOSPADM

## 2023-11-20 RX ORDER — ONDANSETRON 2 MG/ML
INJECTION INTRAMUSCULAR; INTRAVENOUS AS NEEDED
Status: DISCONTINUED | OUTPATIENT
Start: 2023-11-20 | End: 2023-11-20 | Stop reason: SURG

## 2023-11-20 RX ORDER — POLYETHYLENE GLYCOL 3350 17 G/17G
17 POWDER, FOR SOLUTION ORAL 3 TIMES DAILY
Status: DISCONTINUED | OUTPATIENT
Start: 2023-11-20 | End: 2023-11-21 | Stop reason: HOSPADM

## 2023-11-20 RX ORDER — PROPOFOL 10 MG/ML
VIAL (ML) INTRAVENOUS AS NEEDED
Status: DISCONTINUED | OUTPATIENT
Start: 2023-11-20 | End: 2023-11-20 | Stop reason: SURG

## 2023-11-20 RX ORDER — POLYETHYLENE GLYCOL 3350 17 G/17G
17 POWDER, FOR SOLUTION ORAL DAILY PRN
Status: DISCONTINUED | OUTPATIENT
Start: 2023-11-20 | End: 2023-11-20

## 2023-11-20 RX ORDER — HYDROMORPHONE HYDROCHLORIDE 1 MG/ML
0.5 INJECTION, SOLUTION INTRAMUSCULAR; INTRAVENOUS; SUBCUTANEOUS
Status: DISCONTINUED | OUTPATIENT
Start: 2023-11-20 | End: 2023-11-20 | Stop reason: HOSPADM

## 2023-11-20 RX ORDER — AMLODIPINE BESYLATE 5 MG/1
5 TABLET ORAL DAILY
Status: DISCONTINUED | OUTPATIENT
Start: 2023-11-20 | End: 2023-11-21 | Stop reason: HOSPADM

## 2023-11-20 RX ORDER — MIDAZOLAM HYDROCHLORIDE 1 MG/ML
1 INJECTION INTRAMUSCULAR; INTRAVENOUS
Status: DISCONTINUED | OUTPATIENT
Start: 2023-11-20 | End: 2023-11-20 | Stop reason: HOSPADM

## 2023-11-20 RX ORDER — OXYCODONE AND ACETAMINOPHEN 7.5; 325 MG/1; MG/1
1 TABLET ORAL EVERY 4 HOURS PRN
Status: DISCONTINUED | OUTPATIENT
Start: 2023-11-20 | End: 2023-11-21 | Stop reason: HOSPADM

## 2023-11-20 RX ORDER — SODIUM CHLORIDE, SODIUM LACTATE, POTASSIUM CHLORIDE, CALCIUM CHLORIDE 600; 310; 30; 20 MG/100ML; MG/100ML; MG/100ML; MG/100ML
9 INJECTION, SOLUTION INTRAVENOUS CONTINUOUS
Status: DISCONTINUED | OUTPATIENT
Start: 2023-11-20 | End: 2023-11-20

## 2023-11-20 RX ORDER — KETAMINE HCL IN NACL, ISO-OSM 100MG/10ML
SYRINGE (ML) INJECTION AS NEEDED
Status: DISCONTINUED | OUTPATIENT
Start: 2023-11-20 | End: 2023-11-20 | Stop reason: SURG

## 2023-11-20 RX ORDER — IBUPROFEN 600 MG/1
600 TABLET ORAL ONCE AS NEEDED
Status: DISCONTINUED | OUTPATIENT
Start: 2023-11-20 | End: 2023-11-20 | Stop reason: HOSPADM

## 2023-11-20 RX ORDER — SODIUM CHLORIDE 0.9 % (FLUSH) 0.9 %
10 SYRINGE (ML) INJECTION AS NEEDED
Status: DISCONTINUED | OUTPATIENT
Start: 2023-11-20 | End: 2023-11-21 | Stop reason: HOSPADM

## 2023-11-20 RX ORDER — OXYCODONE AND ACETAMINOPHEN 10; 325 MG/1; MG/1
1 TABLET ORAL ONCE AS NEEDED
Status: DISCONTINUED | OUTPATIENT
Start: 2023-11-20 | End: 2023-11-20 | Stop reason: HOSPADM

## 2023-11-20 RX ORDER — SODIUM CHLORIDE, SODIUM LACTATE, POTASSIUM CHLORIDE, CALCIUM CHLORIDE 600; 310; 30; 20 MG/100ML; MG/100ML; MG/100ML; MG/100ML
100 INJECTION, SOLUTION INTRAVENOUS CONTINUOUS PRN
Status: DISCONTINUED | OUTPATIENT
Start: 2023-11-20 | End: 2023-11-20 | Stop reason: HOSPADM

## 2023-11-20 RX ORDER — SODIUM CHLORIDE 0.9 % (FLUSH) 0.9 %
10 SYRINGE (ML) INJECTION AS NEEDED
Status: DISCONTINUED | OUTPATIENT
Start: 2023-11-20 | End: 2023-11-20 | Stop reason: HOSPADM

## 2023-11-20 RX ORDER — LABETALOL HYDROCHLORIDE 5 MG/ML
5 INJECTION, SOLUTION INTRAVENOUS
Status: DISCONTINUED | OUTPATIENT
Start: 2023-11-20 | End: 2023-11-20 | Stop reason: HOSPADM

## 2023-11-20 RX ORDER — OXYCODONE HCL 20 MG/1
20 TABLET, FILM COATED, EXTENDED RELEASE ORAL ONCE
Status: COMPLETED | OUTPATIENT
Start: 2023-11-20 | End: 2023-11-20

## 2023-11-20 RX ORDER — SODIUM CHLORIDE 9 MG/ML
40 INJECTION, SOLUTION INTRAVENOUS AS NEEDED
Status: DISCONTINUED | OUTPATIENT
Start: 2023-11-20 | End: 2023-11-21 | Stop reason: HOSPADM

## 2023-11-20 RX ORDER — ONDANSETRON 2 MG/ML
4 INJECTION INTRAMUSCULAR; INTRAVENOUS EVERY 6 HOURS PRN
Status: DISCONTINUED | OUTPATIENT
Start: 2023-11-20 | End: 2023-11-21 | Stop reason: HOSPADM

## 2023-11-20 RX ORDER — ACETAMINOPHEN 325 MG/1
650 TABLET ORAL EVERY 4 HOURS PRN
Status: DISCONTINUED | OUTPATIENT
Start: 2023-11-20 | End: 2023-11-21 | Stop reason: HOSPADM

## 2023-11-20 RX ORDER — ROPINIROLE 0.25 MG/1
0.5 TABLET, FILM COATED ORAL NIGHTLY
Status: DISCONTINUED | OUTPATIENT
Start: 2023-11-20 | End: 2023-11-21 | Stop reason: HOSPADM

## 2023-11-20 RX ORDER — DEXTROSE MONOHYDRATE 25 G/50ML
12.5 INJECTION, SOLUTION INTRAVENOUS AS NEEDED
Status: DISCONTINUED | OUTPATIENT
Start: 2023-11-20 | End: 2023-11-20 | Stop reason: HOSPADM

## 2023-11-20 RX ORDER — CETIRIZINE HYDROCHLORIDE 10 MG/1
10 TABLET ORAL DAILY
Status: DISCONTINUED | OUTPATIENT
Start: 2023-11-20 | End: 2023-11-21 | Stop reason: HOSPADM

## 2023-11-20 RX ORDER — CEFAZOLIN SODIUM 1 G/50ML
1 INJECTION, SOLUTION INTRAVENOUS ONCE
Status: COMPLETED | OUTPATIENT
Start: 2023-11-20 | End: 2023-11-20

## 2023-11-20 RX ORDER — LAMOTRIGINE 100 MG/1
200 TABLET ORAL DAILY
Status: DISCONTINUED | OUTPATIENT
Start: 2023-11-20 | End: 2023-11-21 | Stop reason: HOSPADM

## 2023-11-20 RX ORDER — LOSARTAN POTASSIUM 50 MG/1
50 TABLET ORAL DAILY
Status: DISCONTINUED | OUTPATIENT
Start: 2023-11-20 | End: 2023-11-21 | Stop reason: HOSPADM

## 2023-11-20 RX ORDER — SODIUM CHLORIDE 0.9 % (FLUSH) 0.9 %
10 SYRINGE (ML) INJECTION EVERY 12 HOURS SCHEDULED
Status: DISCONTINUED | OUTPATIENT
Start: 2023-11-20 | End: 2023-11-20 | Stop reason: HOSPADM

## 2023-11-20 RX ORDER — FLUMAZENIL 0.1 MG/ML
0.2 INJECTION INTRAVENOUS AS NEEDED
Status: DISCONTINUED | OUTPATIENT
Start: 2023-11-20 | End: 2023-11-20 | Stop reason: HOSPADM

## 2023-11-20 RX ORDER — CYCLOBENZAPRINE HCL 10 MG
10 TABLET ORAL 3 TIMES DAILY PRN
Status: DISCONTINUED | OUTPATIENT
Start: 2023-11-20 | End: 2023-11-21 | Stop reason: HOSPADM

## 2023-11-20 RX ORDER — SODIUM CHLORIDE 9 MG/ML
100 INJECTION, SOLUTION INTRAVENOUS CONTINUOUS
Status: DISCONTINUED | OUTPATIENT
Start: 2023-11-20 | End: 2023-11-21 | Stop reason: HOSPADM

## 2023-11-20 RX ORDER — LIDOCAINE HYDROCHLORIDE 10 MG/ML
0.5 INJECTION, SOLUTION EPIDURAL; INFILTRATION; INTRACAUDAL; PERINEURAL ONCE AS NEEDED
Status: DISCONTINUED | OUTPATIENT
Start: 2023-11-20 | End: 2023-11-20 | Stop reason: HOSPADM

## 2023-11-20 RX ORDER — OXYCODONE AND ACETAMINOPHEN 7.5; 325 MG/1; MG/1
2 TABLET ORAL EVERY 4 HOURS PRN
Status: DISCONTINUED | OUTPATIENT
Start: 2023-11-20 | End: 2023-11-21 | Stop reason: HOSPADM

## 2023-11-20 RX ORDER — ONDANSETRON 2 MG/ML
4 INJECTION INTRAMUSCULAR; INTRAVENOUS
Status: DISCONTINUED | OUTPATIENT
Start: 2023-11-20 | End: 2023-11-20 | Stop reason: HOSPADM

## 2023-11-20 RX ORDER — AMOXICILLIN 250 MG
2 CAPSULE ORAL 2 TIMES DAILY
Status: DISCONTINUED | OUTPATIENT
Start: 2023-11-20 | End: 2023-11-21 | Stop reason: HOSPADM

## 2023-11-20 RX ORDER — LIDOCAINE HYDROCHLORIDE 20 MG/ML
INJECTION, SOLUTION EPIDURAL; INFILTRATION; INTRACAUDAL; PERINEURAL AS NEEDED
Status: DISCONTINUED | OUTPATIENT
Start: 2023-11-20 | End: 2023-11-20 | Stop reason: SURG

## 2023-11-20 RX ORDER — NALOXONE HCL 0.4 MG/ML
0.04 VIAL (ML) INJECTION AS NEEDED
Status: DISCONTINUED | OUTPATIENT
Start: 2023-11-20 | End: 2023-11-20 | Stop reason: HOSPADM

## 2023-11-20 RX ORDER — MIDAZOLAM HYDROCHLORIDE 1 MG/ML
0.5 INJECTION INTRAMUSCULAR; INTRAVENOUS
Status: DISCONTINUED | OUTPATIENT
Start: 2023-11-20 | End: 2023-11-20 | Stop reason: HOSPADM

## 2023-11-20 RX ORDER — ONDANSETRON 4 MG/1
4 TABLET, FILM COATED ORAL EVERY 6 HOURS PRN
Status: DISCONTINUED | OUTPATIENT
Start: 2023-11-20 | End: 2023-11-21 | Stop reason: HOSPADM

## 2023-11-20 RX ORDER — SODIUM CHLORIDE, SODIUM LACTATE, POTASSIUM CHLORIDE, CALCIUM CHLORIDE 600; 310; 30; 20 MG/100ML; MG/100ML; MG/100ML; MG/100ML
1000 INJECTION, SOLUTION INTRAVENOUS CONTINUOUS
Status: DISCONTINUED | OUTPATIENT
Start: 2023-11-20 | End: 2023-11-20

## 2023-11-20 RX ORDER — SODIUM CHLORIDE 9 MG/ML
INJECTION, SOLUTION INTRAVENOUS AS NEEDED
Status: DISCONTINUED | OUTPATIENT
Start: 2023-11-20 | End: 2023-11-20 | Stop reason: HOSPADM

## 2023-11-20 RX ORDER — AMOXICILLIN 250 MG
1 CAPSULE ORAL 2 TIMES DAILY
Status: DISCONTINUED | OUTPATIENT
Start: 2023-11-20 | End: 2023-11-20

## 2023-11-20 RX ADMIN — SODIUM CHLORIDE 100 ML/HR: 9 INJECTION, SOLUTION INTRAVENOUS at 22:33

## 2023-11-20 RX ADMIN — AMLODIPINE BESYLATE 5 MG: 5 TABLET ORAL at 13:09

## 2023-11-20 RX ADMIN — HYDROMORPHONE HYDROCHLORIDE 1 MG: 1 INJECTION, SOLUTION INTRAMUSCULAR; INTRAVENOUS; SUBCUTANEOUS at 10:23

## 2023-11-20 RX ADMIN — SODIUM CHLORIDE 100 ML/HR: 9 INJECTION, SOLUTION INTRAVENOUS at 11:57

## 2023-11-20 RX ADMIN — PROPOFOL 150 MCG/KG/MIN: 10 INJECTION, EMULSION INTRAVENOUS at 10:15

## 2023-11-20 RX ADMIN — LOSARTAN POTASSIUM 50 MG: 50 TABLET, FILM COATED ORAL at 13:09

## 2023-11-20 RX ADMIN — OXYCODONE AND ACETAMINOPHEN 1 TABLET: 7.5; 325 TABLET ORAL at 13:05

## 2023-11-20 RX ADMIN — OXYCODONE HYDROCHLORIDE 20 MG: 20 TABLET, FILM COATED, EXTENDED RELEASE ORAL at 05:45

## 2023-11-20 RX ADMIN — PROPOFOL 150 MCG/KG/MIN: 10 INJECTION, EMULSION INTRAVENOUS at 08:42

## 2023-11-20 RX ADMIN — SODIUM CHLORIDE, POTASSIUM CHLORIDE, SODIUM LACTATE AND CALCIUM CHLORIDE 100 ML/HR: 600; 310; 30; 20 INJECTION, SOLUTION INTRAVENOUS at 05:56

## 2023-11-20 RX ADMIN — ONDANSETRON 4 MG: 2 INJECTION INTRAMUSCULAR; INTRAVENOUS at 10:24

## 2023-11-20 RX ADMIN — LAMOTRIGINE 200 MG: 100 TABLET ORAL at 13:09

## 2023-11-20 RX ADMIN — DEXAMETHASONE SODIUM PHOSPHATE 8 MG: 4 INJECTION, SOLUTION INTRA-ARTICULAR; INTRALESIONAL; INTRAMUSCULAR; INTRAVENOUS; SOFT TISSUE at 10:47

## 2023-11-20 RX ADMIN — SODIUM CHLORIDE, POTASSIUM CHLORIDE, SODIUM LACTATE AND CALCIUM CHLORIDE 1000 ML: 600; 310; 30; 20 INJECTION, SOLUTION INTRAVENOUS at 05:56

## 2023-11-20 RX ADMIN — Medication 25 MG: at 10:04

## 2023-11-20 RX ADMIN — MIDAZOLAM HYDROCHLORIDE 1 MG: 1 INJECTION, SOLUTION INTRAMUSCULAR; INTRAVENOUS at 07:07

## 2023-11-20 RX ADMIN — PROPOFOL 50 MG: 10 INJECTION, EMULSION INTRAVENOUS at 07:56

## 2023-11-20 RX ADMIN — DEXAMETHASONE SODIUM PHOSPHATE 4 MG: 4 INJECTION, SOLUTION INTRA-ARTICULAR; INTRALESIONAL; INTRAMUSCULAR; INTRAVENOUS; SOFT TISSUE at 10:24

## 2023-11-20 RX ADMIN — ROPINIROLE HYDROCHLORIDE 0.5 MG: 0.25 TABLET, FILM COATED ORAL at 21:00

## 2023-11-20 RX ADMIN — LIDOCAINE HYDROCHLORIDE 100 MG: 20 INJECTION, SOLUTION EPIDURAL; INFILTRATION; INTRACAUDAL; PERINEURAL at 07:21

## 2023-11-20 RX ADMIN — ONDANSETRON 4 MG: 2 INJECTION INTRAMUSCULAR; INTRAVENOUS at 15:15

## 2023-11-20 RX ADMIN — FENTANYL CITRATE 150 MCG: 0.05 INJECTION, SOLUTION INTRAMUSCULAR; INTRAVENOUS at 07:21

## 2023-11-20 RX ADMIN — PROPOFOL 120 MG: 10 INJECTION, EMULSION INTRAVENOUS at 07:21

## 2023-11-20 RX ADMIN — DOCUSATE SODIUM 50 MG AND SENNOSIDES 8.6 MG 2 TABLET: 8.6; 5 TABLET, FILM COATED ORAL at 21:00

## 2023-11-20 RX ADMIN — FENTANYL CITRATE 100 MCG: 0.05 INJECTION, SOLUTION INTRAMUSCULAR; INTRAVENOUS at 07:55

## 2023-11-20 RX ADMIN — CYCLOBENZAPRINE 10 MG: 10 TABLET, FILM COATED ORAL at 12:23

## 2023-11-20 RX ADMIN — Medication 3 ML: at 21:00

## 2023-11-20 RX ADMIN — CEFAZOLIN SODIUM 1 G: 1 INJECTION, SOLUTION INTRAVENOUS at 07:35

## 2023-11-20 RX ADMIN — PROPOFOL 150 MCG/KG/MIN: 10 INJECTION, EMULSION INTRAVENOUS at 07:22

## 2023-11-20 RX ADMIN — CETIRIZINE HYDROCHLORIDE 10 MG: 10 TABLET ORAL at 13:08

## 2023-11-20 RX ADMIN — Medication 50 MG: at 07:21

## 2023-11-20 RX ADMIN — CEFAZOLIN 2000 MG: 2 INJECTION, POWDER, FOR SOLUTION INTRAMUSCULAR; INTRAVENOUS at 15:15

## 2023-11-20 NOTE — PLAN OF CARE
Goal Outcome Evaluation:  Plan of Care Reviewed With: patient, son        Progress: no change  Outcome Evaluation: Pt A&Ox4. Up x1 ast. Due to void. Aspen collar in place. Dsg CDI. PPP. Basline n/t in BUE and BLE. C/o of pain and n/v w/ PRN meds given. Son at BS. Call light in reach. Safety maintained.

## 2023-11-20 NOTE — PLAN OF CARE
Goal Outcome Evaluation:  Plan of Care Reviewed With: patient, son           Outcome Evaluation: OT eval completed. Pt presents lethargic but oriented x4, with family at bedside. She reports at baseline being independent with all adls and mobility. Pt and family educated on spinal precautions, c-collar fitting/mgt/wear schedule and adl modifications, she acknowledged her understanding. She was able to bring self to sitting at EOB with Mod I. Independently adjust B socks while seated at EOB. CGA for sit <> stand t/f from EOB and CGA-Min A for all functional mobility. Pt demonstrated a narrow PAWEL, decreased step length and attempting to reach for object to support self when amb. She was educated on not attempting to balance self on object that move which could increase her risk for falls. She was returned back to bed with Mod I and able to independently scoot self up in bed. She became nauseous with emesis at end of session. RN notified for pt request of nause meds. Pt would benefit from skilled OT services to address these deficits and assist with continued education. Recommend d/c home with family from facility.      Anticipated Discharge Disposition (OT): home with assist

## 2023-11-20 NOTE — ANESTHESIA PREPROCEDURE EVALUATION
Anesthesia Evaluation     Patient summary reviewed   NPO Solid Status: > 8 hours             Airway   Mallampati: II  Dental      Pulmonary    (-) COPD, asthma, sleep apnea, not a smoker  Cardiovascular   Exercise tolerance: excellent (>7 METS)    (+) hypertension  (-) pacemaker, past MI, angina, cardiac stents      Neuro/Psych  (-) seizures, TIA, CVA  GI/Hepatic/Renal/Endo    (-) GERD, liver disease, no renal disease, diabetes    Musculoskeletal     Abdominal    Substance History      OB/GYN          Other                    Anesthesia Plan    ASA 2     general     intravenous induction     Anesthetic plan, risks, benefits, and alternatives have been provided, discussed and informed consent has been obtained with: patient.    CODE STATUS:

## 2023-11-20 NOTE — ANESTHESIA POSTPROCEDURE EVALUATION
"Patient: Hina Trevino    Procedure Summary       Date: 11/20/23 Room / [facility]:  [facility]  [facility]    Anesthesia Start: 0717 Anesthesia Stop: 1115    Procedures:       CORPECTOMY C6, PARTIAL CORPECTOMY C5, ANTERIOR CERVICAL DISCECTOMY FUSION C4-5, ANTERIOR FUSION WITH INSTRUMENTATION C4-7 (Spine Cervical)      ANTERIOR CERVICAL DISCECTOMY FUSION C4-5, ANTERIOR FUSION WITH INSTRUMENTATION C4-7 (Spine Cervical) Diagnosis: (M54.12)    Surgeons: LARISSA Villanueva MD Provider: Mando Enrique CRNA    Anesthesia Type: general ASA Status: 2            Anesthesia Type: general    Vitals  Vitals Value Taken Time   /77 11/20/23 1142   Temp 98.4 °F (36.9 °C) 11/20/23 1140   Pulse 100 11/20/23 1144   Resp 17 11/20/23 1140   SpO2 99 % 11/20/23 1144   Vitals shown include unfiled device data.        Post Anesthesia Care and Evaluation    Patient location during evaluation: PHASE II  Patient participation: complete - patient participated  Level of consciousness: awake and awake and alert  Pain score: 0  Pain management: adequate    Airway patency: patent  Anesthetic complications: No anesthetic complications  PONV Status: none  Cardiovascular status: acceptable  Respiratory status: acceptable  Hydration status: acceptable    Comments: Patient discharged according to acceptable Jonathan score per RN assessment. See nursing records for further information.     Blood pressure 131/77, pulse 100, temperature 98.4 °F (36.9 °C), temperature source Temporal, resp. rate 17, height 158 cm (62.21\"), weight 61.6 kg (135 lb 12.9 oz), SpO2 99%, not currently breastfeeding.      "

## 2023-11-20 NOTE — ANESTHESIA PROCEDURE NOTES
Airway  Urgency: elective    Date/Time: 11/20/2023 7:22 AM  Airway not difficult    General Information and Staff    Patient location during procedure: OR  CRNA/CAA: Mando Enrique CRNA    Indications and Patient Condition  Indications for airway management: airway protection    Preoxygenated: yes  Mask difficulty assessment: 1 - vent by mask    Final Airway Details  Final airway type: endotracheal airway      Successful airway: ETT  Cuffed: yes   Successful intubation technique: direct laryngoscopy  Endotracheal tube insertion site: oral  Blade: Mary  Blade size: 3  ETT size (mm): 7.5  Cormack-Lehane Classification: grade I - full view of glottis  Placement verified by: chest auscultation and capnometry   Cuff volume (mL): 8  Measured from: teeth  ETT/EBT  to teeth (cm): 21  Number of attempts at approach: 1  Assessment: lips, teeth, and gum same as pre-op and atraumatic intubation

## 2023-11-20 NOTE — OP NOTE
CERVICAL CORPECTOMY, CERVICAL DISCECTOMY ANTERIOR FUSION WITH INSTRUMENTATION  Procedure Note    Hina Trevino  11/20/2023    Pre-op Diagnosis:     1. Cervical spondylotic myelopathy.  2. Bilateral shoulder and arm radiculopathy, left worse than right.   3. Bilateral upper extremity weakness.   4. Mild myelopathic gait.   5. Severe degenerative disc disease C3 to C7.   6. Degenerative cervical scoliosis, concave left C3 to C7.   7. Chronic central disc herniation C5-6.   8. Severe central and foraminal stenosis C4 to C7.   9. Myelomalacia C6.   10. Mild bilateral foraminal stenosis C3-4.   11. Status post irrigation and debridement, infected left total hip arthroplasty.     Post-op Diagnosis:    same    Procedure/CPT® Codes:    1.  Corpectomy C6  2.  Partial corpectomy C5  3.  Anterior cervical discectomy with interbody fusion C3-4, C4-5  4.  Anterior interbody fusion C5-6, C6-7  5.  Anterior spinal instrumentation C3-4, C4-7 (ATEC anterior plate and screws x 2)  6.  Use of interbody biomechanical device for fusion C3-4, C4-5, C5-7 ([id number] spacer x 2, DePuy corpectomy spacer)  7.  Use of locally obtained autograft bone for fusion  8.  Use of allograft bone matrix for fusion (OsteoAmp)  9.  Use of operating microscope for decompression and microdissection  10.  Use of fluoroscopy for confirmation of surgical level, placement of instrumentation and interbody spacers  11.  Intraoperative neural monitoring    Anesthesia: General    Surgeon: GUERO Villanueva MD    Assistant: Jose Alberto Blackmon PA-C    Estimated Blood Loss: 100 mL    Complications: None    Condition: Stable to PACU.    Indications:    The patient is a 63-year-old who sees Olamide Castro nurse practitioner for medical issues.  She presented to the office with signs and symptoms consistent with cervical spondylotic myelopathy.  She was noted to have bilateral shoulder and arm radiculopathy, the left side worse than the right, bilateral upper extremity  weakness, as well as a myelopathic gait.  Imaging studies revealed severe degenerative disc disease from C3-7 associated with a degenerative scoliosis concave to the left.  She was noted to have a chronic central disc herniation at C5-6 and myelomalacia at C6.  The stenosis was most severe from C4-7.    After failing conservative measures, it was mutually decided that surgery would be the best option.  Risks, benefits, and complications of surgery were discussed with the patient.  The patient appeared well informed and wished to proceed.  We specifically discussed the risks of infection, blood loss, nerve root injury, CSF leak, spinal cord injury, and the possibility of incomplete resolution of symptoms.  We also discussed the possible risk of a nonunion and the potential need for additional surgery in the event of a pseudoarthrosis or hardware failure.    Operative Procedure:    After obtaining informed consent and verifying the correct operative levels, the patient was brought to the operating room and placed supine on an operating table.  A general anesthetic was provided by the anesthesia service with the assistance of an endotracheal tube.  Once this was properly positioned and secured, a bump was placed under the patient's shoulders placing the neck into a gentle extended position.  Head halter traction was then used with 10 pounds weight to maintain head position.  The shoulders were taped using 3 inch wide cloth tape to assist with radiographic visualization.  Fluoroscopy was used to identify the disc spaces from C3-7, and the skin was marked for our planned incision.  The anterior neck region was then prepped and draped in usual sterile fashion.  A surgical timeout was taken to confirm this was the correct patient, we are working at the correct levels, and that preoperative antibiotics were given in a timely fashion.    A transverse incision was then created over the anterior cervical region using a 10  blade scalpel directly centered over the levels of interest.  Dissection was carried sharply to subcutaneous tissues.  The platysma was divided in line with the incision and blunt dissection was carried along the medial border of the sternocleidomastoid muscle, lateral to the strap musculature the neck.  The carotid pulse was then palpated, and dissection was carried medial to the carotid sheath and lateral to the trachea and esophagus.  Handheld Cloward retractors along with Kitners were used to dissect through pretracheal and prevertebral fascia.  A radiographic marker was placed into one of the disc spaces and fluoroscopy was used to confirm that we are indeed at the correct levels.  The longus coli muscles were then elevated from either side of the spine using Bovie cautery.    An initial discectomy was started by creating an annulotomy with Bovie cautery.  A Wooten elevator was used to remove some of the disc material off of the endplates.  Disc material was initially removed using forward angled curettes and pituitaries.  Some anterior osteophytes were removed using a rongeur.  All retrieved bone was cleaned, morcellized and saved for later packing into the disc spaces to assist with obtaining a fusion.  Amarillo pins were then placed to allow gentle distraction across the disc spaces.  The microscope was then positioned for the microdissection and decompression portion of the procedure.    I started at the C3-4 and C4-5 disc spaces using Kerrisons to remove remaining anterior osteophytes off of the endplates.  Straight curettes were used to remove the cartilaginous endplates and all remaining disc material.  The high-speed bur was then used to remove posterior osteophytes and to contour the endplates in preparation for fusion.  A forward angled curette was used to free up the posterior margins of the vertebral bodies, releasing the posterior longitudinal ligament.  Posterior osteophytes, posterior disc material,  and the PLL were all resected using Kerrisons.  Kerrisons were also used to perform a bilateral neural foraminotomy.  At the end of the discectomy decompression, the central spinal canal and the exiting nerve roots at both C3-4 and C4-5 appeared to be free of compression.  Bleeding in the epidural space was controlled using FloSeal.  The wound was then copiously irrigated with saline solution and attention was turned to C5-6 and C6-7.    Given the severe central stenosis as well as the advanced degenerative changes at both C5-6 and C6-7, it was felt necessary to perform a corpectomy of C6 in order to adequately and safely decompress the central spinal canal.  After initially preparing the disc spaces previously, I used a rongeur to remove the central portion of the C6 vertebral body.  All retrieved bone was saved for later packing back into spacers to assist with obtaining a fusion.  The rongeur was used as posterior as safely possible, leaving a thin shelf of posterior bone.  I used a forward angled curettes to release this as much as possible and then resected it with Kerrisons.  The curettes were used to release the posterior longitudinal ligament and this was resected using Kerrisons as well revealing the central spinal canal.  Kerrisons were used to remove osteophytes from the superior endplate of C7 and to perform a neural foraminotomy of the exiting nerve roots at that area.  Bleeding in the epidural space was controlled using FloSeal.    There was still very severe stenosis at the posterior aspect of the endplate inferiorly of C5.  I tried to decompress this area with Kerrisons and curettes, but it was felt that a partial corpectomy would be necessary to more safely decompress this area.  Using a combination of Kerrisons, the high-speed bur, and the rongeur, I removed approximately 50% of the C5 vertebral body inferiorly.  This allowed much more visualization to safely decompress the central spinal canal.  I  then performed a bilateral neural foraminotomy with Kerrisons of the exiting nerve roots at what was the C5-6 disc space.  Again bleeding was controlled using FloSeal.  The wound was copiously irrigated with saline solution.  At the end of the decompression involving the corpectomy of C6 and the partial corpectomy of C5, the central spinal canal and all exiting nerve roots were free of compression.    The calipers were then used to measure the corpectomy defect from the superior endplate of C7 up to the remaining portion of C5.  An appropriately sized corpectomy cage from the DePuy instrumentation set with the larger footprint was then packed as tightly as possible with locally obtained autograft bone mixed with an allograft matrix called OsteoAmp.  This PEEK corpectomy cage was then tapped into position spanning the corpectomy defect from the remaining portion of C5 down to C7.  This spacer was placed as an interbody biomechanical device to assist with fusion.    Next, trial spacers were tapped into position into the disc spaces of C3-4 and C4-5.  Once the appropriate size was determined, an actual spacer from [employer] matching the same size as the trial was delivered to the sterile field for each space.  The spacers were packed as tightly as possible with locally obtained autograft bone also mixed with an allograft bone matrix called OsteoAmp.  The bases were then malleted into position into the disc spaces of C3-4 and C4-5 under fluoroscopic guidance.  They were placed as interbody biomechanical devices to assist with fusion.    After confirming the interbody spacers were properly positioned with fluoroscopy, the Manning pins were removed.  Remaining anterior osteophytes were contoured off of the vertebral bodies using a combination of the high-speed bur and the Rongeur to allow for the best possible plate fixation.  An anterior plate from the [facility] instrumentation set was then chosen to span C4 to 7.  This was  held into position using a series of screws.  I placed 2 screws into C4, 2 screws into the remaining vertebral body of C5, as well as 2 screws into C7 for a total of 6 screws.  I then placed a second anterior plate from the [facility] instrumentation set to span C3-4 individually.  This was held into position using 2 screws into C3 and 2 screws into C4.    Final fluoroscopy imaging confirmed adequate position of the plates and screws as well as the interbody spacers.  All implants appeared to be properly positioned with good maintenance of cervical alignment.  A final inspection of the operative field was then undertaken to ensure that we had adequate hemostasis.  Bleeding at this point was controlled with FloSeal and bipolar cautery.    Closure was accomplished by reapproximating the platysma with a 3-0 Vicryl.  Immediate subcutaneous tissues were reapproximated with a 4-0 Vicryl in a buried fashion.  Final skin closure was accomplished with Mastisol and Steri-Strips.  The wound was then washed and a sterile Bioclusive dressing was applied.  The patient was then placed into a hard cervical collar, extubated, and sent to the recovery room in good stable condition.    The patient tolerated the procedure well.  There were no complications.  We estimated blood loss to be 100 mL.  Intraoperative neural monitoring was used during the procedure.  We used motor evoked potentials, EEG, TOF, free run EMG, and SSEPs.  There were no neuro monitoring signal changes during the procedure.    Jose Alberto Blackomn PA-C provided critical assistance during the procedure.  His assistance was medically necessary in order to allow the procedure to occur in the most safe and efficient manner.  He assisted with not only patient positioning and wound closure, but more importantly with retraction of delicate neurovascular structures, assistance during the corpectomy and discectomy decompressions, as well as the placement of the interbody spacers and  instrumentation to obtain a fusion.    GUERO Villanueva MD     Date: 11/20/2023  Time: 11:04 CST

## 2023-11-20 NOTE — THERAPY EVALUATION
Patient Name: Hina Trevino  : 1960    MRN: [id number]                              Today's Date: 2023       Admit Date: 2023    Visit Dx: No diagnosis found.  Patient Active Problem List   Diagnosis    Cervical myelopathy    Primary hypertension    GERD without esophagitis    Drug induced constipation     Past Medical History:   Diagnosis Date    Anxiety     Arthritis     Depression     Hypertension     Restless leg syndrome      Past Surgical History:   Procedure Laterality Date    COLONOSCOPY      ENDOSCOPY      INCISION AND DRAINAGE HIP Left     JOINT REPLACEMENT      TOTAL HIP ARTHROPLASTY Left     dr cuadra in [facility]      General Information       Row Name 23 1450          OT Time and Intention    Document Type evaluation  cervical scoliosis & spondylotic myelopathy, B shoulder & arm radiculopathy s/p corpectomy C6 & partial corpectomy C5, disectomy & fusion C4-7 w/ instrumentation on 23  -J     Mode of Treatment occupational therapy  -JJ       Row Name 23 1450          General Information    Patient Profile Reviewed yes  -JJ     Prior Level of Function independent:;all household mobility;transfer;bed mobility;ADL's  -JJ     Existing Precautions/Restrictions fall;cervical collar;brace on at all times;spinal  -JJ     Barriers to Rehab medically complex  -JJ       Row Name 23 1450          Living Environment    People in Home child(michelle), adult  -JJ       Row Name 23 1450          Cognition    Orientation Status (Cognition) oriented x 4  -JJ       Row Name 23 145          Safety Issues, Functional Mobility    Impairments Affecting Function (Mobility) balance;sensation/sensory awareness;pain  -JJ               User Key  (r) = Recorded By, (t) = Taken By, (c) = Cosigned By      Initials Name Provider Type    Kori Kirkpatrick, TARAR/L, CSRS Occupational Therapist                     Mobility/ADL's       Row Name 23 1454          Bed  Mobility    Bed Mobility supine-sit;sit-supine;scooting/bridging  -     Scooting/Bridging [place] (Bed Mobility) independent  -     Supine-Sit [place] (Bed Mobility) modified independence  -     Sit-Supine [place] (Bed Mobility) modified independence  -     Assistive Device (Bed Mobility) head of bed elevated  -       Row Name 11/20/23 1450          Transfers    Transfers sit-stand transfer;stand-sit transfer  -       Row Name 11/20/23 1450          Sit-Stand Transfer    Sit-Stand [place] (Transfers) supervision;contact guard  -       Row Name 11/20/23 1450          Stand-Sit Transfer    Stand-Sit [place] (Transfers) supervision;contact guard  -     Assistive Device (Stand-Sit Transfers) walker, front-wheeled  -       Row Name 11/20/23 1450          Functional Mobility    Functional Mobility- Ind. Level contact guard assist  -     Functional Mobility- Device --  HHAx1  -     Functional Mobility- Safety Issues --  -     Functional Mobility- Comment in hallway. Pt was decreased step length, attempting to reach for unstable objects to help steady self. Educated on the importance of not attempting to balance self with objects that are moveable.  -       Row Name 11/20/23 1450          Activities of Daily Living    BADL Assessment/Intervention lower body dressing;upper body dressing  -       Row Name 11/20/23 1450          Lower Body Dressing Assessment/Training    [place] Level (Lower Body Dressing) socks;standby assist  -     Position (Lower Body Dressing) edge of bed sitting  -J     Comment, (Lower Body Dressing) adjust  -       Row Name 11/20/23 1450          Upper Body Dressing Assessment/Training    [place] Level (Upper Body Dressing) don;doff;maximum assist (25% patient effort)  -J     Position (Upper Body Dressing) edge of bed sitting  -     Comment, (Upper Body Dressing) c-collar don/doffed for educational purposes and  repositioning/adjustments.  -               User Key  (r) = Recorded By, (t) = Taken By, (c) = Cosigned By      Initials Name Provider Type    Kori Kirkpatrick OTR/L, MARJORIE Occupational Therapist                   Obj/Interventions       Row Name 11/20/23 1450          Sensory Assessment (Somatosensory)    Sensory Assessment (Somatosensory) --  -     Sensory Assessment reports n/t in B UEs.  -       Row Name 11/20/23 1450          Vision Assessment/Intervention    Visual Impairment/Limitations WFL  -       Row Name 11/20/23 1450          Range of Motion Comprehensive    General Range of Motion bilateral upper extremity ROM L  -       Row Name 11/20/23 1450          Strength Comprehensive (MMT)    Comment, General Manual Muscle Testing (MMT) Assessment B UE strength grossly 5/5  -       Row Name 11/20/23 1450          Motor Skills    Motor Skills coordination  -     Coordination gross motor deficit;left;upper extremity;minimal impairment;finger to nose  -       Row Name 11/20/23 1450          Balance    Balance Assessment sitting static balance;sitting dynamic balance;standing dynamic balance;standing static balance  -JJ     Static Sitting Balance independent  -JJ     Dynamic Sitting Balance standby assist  -JJ     Position, Sitting Balance unsupported;sitting edge of bed  -JJ     Static Standing Balance contact guard  -JJ     Dynamic Standing Balance minimal assist  -JJ     Position/Device Used, Standing Balance supported  -               User Key  (r) = Recorded By, (t) = Taken By, (c) = Cosigned By      Initials Name Provider Type    Kori Kirkpatrick OTR/L, CSRS Occupational Therapist                   Goals/Plan       Row Name 11/20/23 1450          Dressing Goal 1 (OT)    Activity/Device (Dressing Goal 1, OT) upper body dressing  -JJ     [place]/Cues Needed (Dressing Goal 1, OT) independent  -JJ     Time Frame (Dressing Goal 1, OT) long term goal (LTG);by discharge  -      Strategies/Barriers (Dressing Goal 1, OT) to include c-collar  -JJ     Progress/Outcome (Dressing Goal 1, OT) new goal  -JJ       Row Name 11/20/23 1450          Toileting Goal 1 (OT)    Activity/Device (Toileting Goal 1, OT) toileting skills, all  -JJ     [place] Level/Cues Needed (Toileting Goal 1, OT) independent  -JJ     Time Frame (Toileting Goal 1, OT) long term goal (LTG);by discharge  -JJ     Progress/Outcome (Toileting Goal 1, OT) new goal  -JJ       Row Name 11/20/23 1450          Problem Specific Goal 1 (OT)    Problem Specific Goal 1 (OT) Pt will provide teachback of 3/3 spinal precautions and demonstrate proper c-collar fitting/mgt/wear schedule 100% of the time.  -JJ     Time Frame (Problem Specific Goal 1, OT) long term goal (LTG);by discharge  -JJ     Progress/Outcome (Problem Specific Goal 1, OT) new goal  -       Row Name 11/20/23 1450          Therapy Assessment/Plan (OT)    Planned Therapy Interventions (OT) activity tolerance training;adaptive equipment training;BADL retraining;functional balance retraining;transfer/mobility retraining;strengthening exercise;occupation/activity based interventions;patient/caregiver education/training  -               User Key  (r) = Recorded By, (t) = Taken By, (c) = Cosigned By      Initials Name Provider Type    Kori Kirkpatrick, OTBRANDY/L, CSRS Occupational Therapist                   Clinical Impression       Row Name 11/20/23 1450          Pain Assessment    Pain Intervention(s) Medication (See MAR);Repositioned;Ambulation/increased activity  -J     Additional Documentation Pain Scale: FACES Pre/Post-Treatment (Group)  -J       Row Name 11/20/23 1450          Pain Scale: FACES Pre/Post-Treatment    Pain: FACES Scale, Pretreatment 2-->hurts little bit  -JJ     Posttreatment Pain Rating 2-->hurts little bit  -JJ     Pain Location incisional  -JJ     Pain Location - neck  -J       Row Name 11/20/23 1450          Plan of Care Review    Plan of Care  Reviewed With patient;son  -     Outcome Evaluation OT eval completed. Pt presents lethargic but oriented x4, with family at bedside. She reports at baseline being independent with all adls and mobility. Pt and family educated on spinal precautions, c-collar fitting/mgt/wear schedule and adl modifications, she acknowledged her understanding. She was able to bring self to sitting at EOB with Mod I. Independently adjust B socks while seated at EOB. CGA for sit <> stand t/f from EOB and CGA-Min A for all functional mobility. Pt demonstrated a narrow PAWEL, decreased step length and attempting to reach for object to support self when amb. She was educated on not attempting to balance self on object that move which could increase her risk for falls. She was returned back to bed with Mod I and able to independently scoot self up in bed. She became nauseous with emesis at end of session. RN notified for pt request of nause meds. Pt would benefit from skilled OT services to address these deficits and assist with continued education. Recommend d/c home with family from facility.  -       Row Name 11/20/23 1455          Therapy Assessment/Plan (OT)    Rehab Potential (OT) good, to achieve stated therapy goals  -     Criteria for Skilled Therapeutic Interventions Met (OT) yes;skilled treatment is necessary  -     Therapy Frequency (OT) 3 times/wk  -     Predicted Duration of Therapy Intervention (OT) 10 days  -       Row Name 11/20/23 1760          Therapy Plan Review/Discharge Plan (OT)    Anticipated Discharge Disposition (OT) home with assist  -       Row Name 11/20/23 1456          Positioning and Restraints    Pre-Treatment Position in bed  -J     Post Treatment Position bed  -JJ     In Bed notified nsg;sitting;call light within reach;encouraged to call for assist;with family/caregiver;side rails up x3;with brace;SCD pump applied  -               User Key  (r) = Recorded By, (t) = Taken By, (c) = Cosigned  By      Initials Name Provider Type    Kori Kirkpatrick OTR/L, MARJORIE Occupational Therapist                   Outcome Measures       Row Name 11/20/23 1450          How much help from another is currently needed...    Putting on and taking off regular lower body clothing? 3  -JJ     Bathing (including washing, rinsing, and drying) 3  -JJ     Toileting (which includes using toilet bed pan or urinal) 3  -JJ     Putting on and taking off regular upper body clothing 3  -JJ     Taking care of personal grooming (such as brushing teeth) 4  -JJ     Eating meals 4  -JJ     AM-PAC 6 Clicks Score (OT) 20  -JJ       Row Name 11/20/23 1152          How much help from another person do you currently need...    Turning from your back to your side while in flat bed without using bedrails? 3  -CP     Moving from lying on back to sitting on the side of a flat bed without bedrails? 3  -CP     Moving to and from a bed to a chair (including a wheelchair)? 3  -CP     Standing up from a chair using your arms (e.g., wheelchair, bedside chair)? 3  -CP     Climbing 3-5 steps with a railing? 3  -CP     To walk in hospital room? 3  -CP     AM-PAC 6 Clicks Score (PT) 18  -CP     Highest Level of Mobility Goal 6 --> Walk 10 steps or more  -CP       Row Name 11/20/23 1450          Functional Assessment    Outcome Measure Options AM-PAC 6 Clicks Daily Activity (OT)  -               User Key  (r) = Recorded By, (t) = Taken By, (c) = Cosigned By      Initials Name Provider Type    Kori Kirkpatrick OTR/L, MARJORIE Occupational Therapist    CP Edilma Gonzalez, RN Registered Nurse                    Occupational Therapy Education       Title: PT OT SLP Therapies (In Progress)       Topic: Occupational Therapy (In Progress)       Point: ADL training (Done)       Description:   Instruct learner(s) on proper safety adaptation and remediation techniques during self care or transfers.   Instruct in proper use of assistive devices.                   Learning Progress Summary             Patient Acceptance, E, VU by STEFANY at 11/20/2023 1523                         Point: Home exercise program (Not Started)       Description:   Instruct learner(s) on appropriate technique for monitoring, assisting and/or progressing therapeutic exercises/activities.                  Learner Progress:  Not documented in this visit.              Point: Precautions (Done)       Description:   Instruct learner(s) on prescribed precautions during self-care and functional transfers.                  Learning Progress Summary             Patient Acceptance, E, VU by STEFANY at 11/20/2023 1523                         Point: Body mechanics (Done)       Description:   Instruct learner(s) on proper positioning and spine alignment during self-care, functional mobility activities and/or exercises.                  Learning Progress Summary             Patient Acceptance, E, VU by STEFANY at 11/20/2023 1523                                         User Key       Initials Effective Dates Name Provider Type Discipline    STEFANY 07/11/23 -  Kori Jon, OTR/L, CSRS Occupational Therapist OT                  OT Recommendation and Plan  Planned Therapy Interventions (OT): activity tolerance training, adaptive equipment training, BADL retraining, functional balance retraining, transfer/mobility retraining, strengthening exercise, occupation/activity based interventions, patient/caregiver education/training  Therapy Frequency (OT): 3 times/wk  Plan of Care Review  Plan of Care Reviewed With: patient, son  Outcome Evaluation: OT eval completed. Pt presents lethargic but oriented x4, with family at bedside. She reports at baseline being independent with all adls and mobility. Pt and family educated on spinal precautions, c-collar fitting/mgt/wear schedule and adl modifications, she acknowledged her understanding. She was able to bring self to sitting at EOB with Mod I. Independently adjust B socks while  seated at EOB. CGA for sit <> stand t/f from EOB and CGA-Min A for all functional mobility. Pt demonstrated a narrow PAWEL, decreased step length and attempting to reach for object to support self when amb. She was educated on not attempting to balance self on object that move which could increase her risk for falls. She was returned back to bed with Mod I and able to independently scoot self up in bed. She became nauseous with emesis at end of session. RN notified for pt request of nause meds. Pt would benefit from skilled OT services to address these deficits and assist with continued education. Recommend d/c home with family from facility.     Time Calculation:         Time Calculation- OT       Row Name 11/20/23 1450             Time Calculation- OT    OT Start Time 1445  -      OT Stop Time 1524  -      OT Time Calculation (min) 39 min  -      OT Received On 11/20/23  -TANA      OT Goal Re-Cert Due Date 11/30/23  -                User Key  (r) = Recorded By, (t) = Taken By, (c) = Cosigned By      Initials Name Provider Type    Kori Kirkpatrick OTR/L, MARJORIE Occupational Therapist                  Therapy Charges for Today       Code Description Service Date Service Provider Modifiers Qty    41793503815 HC OT EVAL LOW COMPLEXITY 3 11/20/2023 Kori Jon OTR/L, ELANS GO 1                 Kori Jon, OTR/L, ELANS  11/20/2023

## 2023-11-21 VITALS
HEART RATE: 86 BPM | BODY MASS INDEX: 24.99 KG/M2 | TEMPERATURE: 97.9 F | HEIGHT: 62 IN | RESPIRATION RATE: 18 BRPM | WEIGHT: 135.8 LBS | SYSTOLIC BLOOD PRESSURE: 158 MMHG | OXYGEN SATURATION: 99 % | DIASTOLIC BLOOD PRESSURE: 75 MMHG

## 2023-11-21 LAB
ANION GAP SERPL CALCULATED.3IONS-SCNC: 9 MMOL/L (ref 5–15)
BASOPHILS # BLD AUTO: 0.01 10*3/MM3 (ref 0–0.2)
BASOPHILS NFR BLD AUTO: 0.1 % (ref 0–1.5)
BUN SERPL-MCNC: 18 MG/DL (ref 8–23)
BUN/CREAT SERPL: 24.3 (ref 7–25)
CALCIUM SPEC-SCNC: 8.4 MG/DL (ref 8.6–10.5)
CHLORIDE SERPL-SCNC: 106 MMOL/L (ref 98–107)
CO2 SERPL-SCNC: 24 MMOL/L (ref 22–29)
CREAT SERPL-MCNC: 0.74 MG/DL (ref 0.57–1)
DEPRECATED RDW RBC AUTO: 38.5 FL (ref 37–54)
EGFRCR SERPLBLD CKD-EPI 2021: 89.9 ML/MIN/1.73
EOSINOPHIL # BLD AUTO: 0 10*3/MM3 (ref 0–0.4)
EOSINOPHIL NFR BLD AUTO: 0 % (ref 0.3–6.2)
ERYTHROCYTE [DISTWIDTH] IN BLOOD BY AUTOMATED COUNT: 12.3 % (ref 12.3–15.4)
FERRITIN SERPL-MCNC: 72.11 NG/ML (ref 13–150)
FOLATE SERPL-MCNC: >20 NG/ML (ref 4.78–24.2)
GLUCOSE SERPL-MCNC: 124 MG/DL (ref 65–99)
HCT VFR BLD AUTO: 32.1 % (ref 34–46.6)
HGB BLD-MCNC: 10.7 G/DL (ref 12–15.9)
IMM GRANULOCYTES # BLD AUTO: 0.04 10*3/MM3 (ref 0–0.05)
IMM GRANULOCYTES NFR BLD AUTO: 0.4 % (ref 0–0.5)
IRON 24H UR-MRATE: 37 MCG/DL (ref 37–145)
IRON SATN MFR SERPL: 11 % (ref 20–50)
LYMPHOCYTES # BLD AUTO: 0.99 10*3/MM3 (ref 0.7–3.1)
LYMPHOCYTES NFR BLD AUTO: 8.7 % (ref 19.6–45.3)
MCH RBC QN AUTO: 28.8 PG (ref 26.6–33)
MCHC RBC AUTO-ENTMCNC: 33.3 G/DL (ref 31.5–35.7)
MCV RBC AUTO: 86.3 FL (ref 79–97)
MONOCYTES # BLD AUTO: 1.05 10*3/MM3 (ref 0.1–0.9)
MONOCYTES NFR BLD AUTO: 9.3 % (ref 5–12)
NEUTROPHILS NFR BLD AUTO: 81.5 % (ref 42.7–76)
NEUTROPHILS NFR BLD AUTO: 9.26 10*3/MM3 (ref 1.7–7)
NRBC BLD AUTO-RTO: 0 /100 WBC (ref 0–0.2)
PLATELET # BLD AUTO: 219 10*3/MM3 (ref 140–450)
PMV BLD AUTO: 10.7 FL (ref 6–12)
POTASSIUM SERPL-SCNC: 4.3 MMOL/L (ref 3.5–5.2)
RBC # BLD AUTO: 3.72 10*6/MM3 (ref 3.77–5.28)
SODIUM SERPL-SCNC: 139 MMOL/L (ref 136–145)
TIBC SERPL-MCNC: 337 MCG/DL (ref 298–536)
TRANSFERRIN SERPL-MCNC: 226 MG/DL (ref 200–360)
VIT B12 BLD-MCNC: 995 PG/ML (ref 211–946)
WBC NRBC COR # BLD AUTO: 11.35 10*3/MM3 (ref 3.4–10.8)

## 2023-11-21 PROCEDURE — 85025 COMPLETE CBC W/AUTO DIFF WBC: CPT | Performed by: STUDENT IN AN ORGANIZED HEALTH CARE EDUCATION/TRAINING PROGRAM

## 2023-11-21 PROCEDURE — 80048 BASIC METABOLIC PNL TOTAL CA: CPT | Performed by: STUDENT IN AN ORGANIZED HEALTH CARE EDUCATION/TRAINING PROGRAM

## 2023-11-21 PROCEDURE — 82746 ASSAY OF FOLIC ACID SERUM: CPT | Performed by: STUDENT IN AN ORGANIZED HEALTH CARE EDUCATION/TRAINING PROGRAM

## 2023-11-21 PROCEDURE — 82728 ASSAY OF FERRITIN: CPT | Performed by: STUDENT IN AN ORGANIZED HEALTH CARE EDUCATION/TRAINING PROGRAM

## 2023-11-21 PROCEDURE — 84466 ASSAY OF TRANSFERRIN: CPT | Performed by: STUDENT IN AN ORGANIZED HEALTH CARE EDUCATION/TRAINING PROGRAM

## 2023-11-21 PROCEDURE — 25010000002 ONDANSETRON PER 1 MG: Performed by: ORTHOPAEDIC SURGERY

## 2023-11-21 PROCEDURE — 82607 VITAMIN B-12: CPT | Performed by: STUDENT IN AN ORGANIZED HEALTH CARE EDUCATION/TRAINING PROGRAM

## 2023-11-21 PROCEDURE — 25010000002 CEFAZOLIN PER 500 MG: Performed by: ORTHOPAEDIC SURGERY

## 2023-11-21 PROCEDURE — 83540 ASSAY OF IRON: CPT | Performed by: STUDENT IN AN ORGANIZED HEALTH CARE EDUCATION/TRAINING PROGRAM

## 2023-11-21 PROCEDURE — 97161 PT EVAL LOW COMPLEX 20 MIN: CPT | Performed by: PHYSICAL THERAPIST

## 2023-11-21 RX ORDER — OXYCODONE HYDROCHLORIDE AND ACETAMINOPHEN 5; 325 MG/1; MG/1
1 TABLET ORAL EVERY 8 HOURS PRN
Qty: 30 TABLET | Refills: 0 | Status: SHIPPED | OUTPATIENT
Start: 2023-11-21

## 2023-11-21 RX ORDER — CYCLOBENZAPRINE HCL 10 MG
10 TABLET ORAL 3 TIMES DAILY PRN
Qty: 30 TABLET | Refills: 0 | Status: SHIPPED | OUTPATIENT
Start: 2023-11-21

## 2023-11-21 RX ADMIN — DOCUSATE SODIUM 50 MG AND SENNOSIDES 8.6 MG 2 TABLET: 8.6; 5 TABLET, FILM COATED ORAL at 08:06

## 2023-11-21 RX ADMIN — CETIRIZINE HYDROCHLORIDE 10 MG: 10 TABLET ORAL at 08:06

## 2023-11-21 RX ADMIN — ONDANSETRON 4 MG: 2 INJECTION INTRAMUSCULAR; INTRAVENOUS at 08:05

## 2023-11-21 RX ADMIN — LAMOTRIGINE 200 MG: 100 TABLET ORAL at 08:06

## 2023-11-21 RX ADMIN — POLYETHYLENE GLYCOL 3350 17 G: 17 POWDER, FOR SOLUTION ORAL at 08:07

## 2023-11-21 RX ADMIN — Medication 3 ML: at 08:09

## 2023-11-21 RX ADMIN — CEFAZOLIN 2000 MG: 2 INJECTION, POWDER, FOR SOLUTION INTRAMUSCULAR; INTRAVENOUS at 00:14

## 2023-11-21 RX ADMIN — LOSARTAN POTASSIUM 50 MG: 50 TABLET, FILM COATED ORAL at 08:08

## 2023-11-21 RX ADMIN — AMLODIPINE BESYLATE 5 MG: 5 TABLET ORAL at 08:06

## 2023-11-21 NOTE — THERAPY EVALUATION
Patient Name: Hina Trevino  : 1960    MRN: [id number]                              Today's Date: 2023       Admit Date: 2023    Visit Dx:     ICD-10-CM ICD-9-CM   1. Cervical myelopathy  G95.9 721.1   2. Impaired mobility [Z74.09]  Z74.09 799.89     Patient Active Problem List   Diagnosis    Cervical myelopathy    Primary hypertension    GERD without esophagitis    Drug induced constipation     Past Medical History:   Diagnosis Date    Anxiety     Arthritis     Depression     Hypertension     Restless leg syndrome      Past Surgical History:   Procedure Laterality Date    COLONOSCOPY      ENDOSCOPY      INCISION AND DRAINAGE HIP Left     JOINT REPLACEMENT      TOTAL HIP ARTHROPLASTY Left     dr cuadra in [facility]      General Information       Row Name 2315          Physical Therapy Time and Intention    Document Type evaluation  s/p ACDF C4-7 due to cervical spondylotic myelopathy, B SH and UE radiculopathy L>R, B UE weakness, mild myelopathic gait, s/p I and D infected L THR last year  -MS     Mode of Treatment physical therapy;individual therapy  -MS       Row Name 2315          General Information    Patient Profile Reviewed yes  -MS     Prior Level of Function independent:;all household mobility;community mobility;ADL's  -MS     Existing Precautions/Restrictions fall;cervical collar;brace on at all times;spinal  -MS     Barriers to Rehab medically complex  -MS       Row Name 2315          Living Environment    People in Home child(michelle), adult  Son  -MS       Row Name 23 0715          Home Main Entrance    Number of Stairs, Main Entrance four  -MS     Stair Railings, Main Entrance railing on right side (ascending)  -MS       Row Name 2315          Stairs Within Home, Primary    Number of Stairs, Within Home, Primary none  -MS       Row Name 23 0715          Cognition    Orientation Status (Cognition) oriented x 4  -MS       Row  Name 11/21/23 0715          Safety Issues, Functional Mobility    Impairments Affecting Function (Mobility) balance;sensation/sensory awareness  -MS               User Key  (r) = Recorded By, (t) = Taken By, (c) = Cosigned By      Initials Name Provider Type    Destinee Lacey BRANDY, PT, DPT, NCS Physical Therapist                   Mobility       Row Name 11/21/23 0715          Bed Mobility    Bed Mobility rolling right;sidelying-sit  -MS     Rolling Right [place] (Bed Mobility) modified independence  -MS     Sidelying-Sit [place] (Bed Mobility) modified independence  -MS     Assistive Device (Bed Mobility) bed rails  -MS       Row Name 11/21/23 0715          Sit-Stand Transfer    Sit-Stand [place] (Transfers) contact guard  -MS       Row Name 11/21/23 0715          Gait/Stairs (Locomotion)    [place] Level (Gait) contact guard  -MS     Distance in Feet (Gait) 200ft with stiff midfoot gait pattern, pt was able to wean into a heel strike gait pattern with cues, L LE ER  -MS               User Key  (r) = Recorded By, (t) = Taken By, (c) = Cosigned By      Initials Name Provider Type    Destinee Lacey BRANDY, PT, DPT, NCS Physical Therapist                   Obj/Interventions       Row Name 11/21/23 0715          Range of Motion Comprehensive    General Range of Motion bilateral upper extremity ROM WFL;bilateral lower extremity ROM WFL  -MS       Row Name 11/21/23 0715          Strength Comprehensive (MMT)    Comment, General Manual Muscle Testing (MMT) Assessment B UE 5/5 except wrist flexion 4/5, L EHL 3+/5, L dorsiflexion 4/5, R hamstring 4/5  -MS       Row Name 11/21/23 0715          Motor Skills    Motor Skills coordination  -MS     Coordination fine motor deficit;WFL;gross motor deficit;finger to nose;minimal impairment  finger to nose impaired R>L with eyes closed  -MS       Row Name 11/21/23 0715          Balance    Balance Assessment sitting static balance;sitting dynamic balance;standing  static balance;standing dynamic balance  -MS     Static Sitting Balance independent  -MS     Dynamic Sitting Balance independent  -MS     Position, Sitting Balance unsupported;sitting edge of bed  -MS     Static Standing Balance contact guard  -MS     Dynamic Standing Balance contact guard  -MS     Position/Device Used, Standing Balance unsupported  -MS       Row Name 11/21/23 0715          Sensory Assessment (Somatosensory)    Sensory Assessment (Somatosensory) sensation intact  -MS               User Key  (r) = Recorded By, (t) = Taken By, (c) = Cosigned By      Initials Name Provider Type    MS Destinee Sarmiento, PT, DPT, NCS Physical Therapist                   Goals/Plan       Row Name 11/21/23 0715          Bed Mobility Goal 1 (PT)    Activity/Assistive Device (Bed Mobility Goal 1, PT) bed mobility activities, all  -MS     [place] Level/Cues Needed (Bed Mobility Goal 1, PT) independent  -MS     Time Frame (Bed Mobility Goal 1, PT) long term goal (LTG);by discharge  -MS     Progress/Outcomes (Bed Mobility Goal 1, PT) new goal  -MS       Row Name 11/21/23 0715          Transfer Goal 1 (PT)    Activity/Assistive Device (Transfer Goal 1, PT) sit-to-stand/stand-to-sit;bed-to-chair/chair-to-bed  -MS     [place] Level/Cues Needed (Transfer Goal 1, PT) independent  -MS     Time Frame (Transfer Goal 1, PT) long term goal (LTG);by discharge  -MS     Progress/Outcome (Transfer Goal 1, PT) new goal  -MS       Row Name 11/21/23 0715          Gait Training Goal 1 (PT)    Activity/Assistive Device (Gait Training Goal 1, PT) gait (walking locomotion);decrease fall risk;increase endurance/gait distance;improve balance and speed  -MS     [place] Level (Gait Training Goal 1, PT) independent  -MS     Distance (Gait Training Goal 1, PT) 300ft with heel strike 75% of the time B  -MS     Time Frame (Gait Training Goal 1, PT) long term goal (LTG);by discharge  -MS     Progress/Outcome (Gait Training Goal 1, PT) new goal   -MS       Row Name 11/21/23 0715          Stairs Goal 1 (PT)    Activity/Assistive Device (Stairs Goal 1, PT) ascending stairs;descending stairs;using handrail, right;step-to-step  -MS     [place] Level/Cues Needed (Stairs Goal 1, PT) modified independence  -MS     Number of Stairs (Stairs Goal 1, PT) 4  -MS     Time Frame (Stairs Goal 1, PT) long term goal (LTG);by discharge  -MS     Progress/Outcome (Stairs Goal 1, PT) new goal  -MS       Row Name 11/21/23 0715          Therapy Assessment/Plan (PT)    Planned Therapy Interventions (PT) balance training;bed mobility training;gait training;patient/family education;orthotic fitting/training;neuromuscular re-education;motor coordination training;transfer training;stair training;strengthening  -MS               User Key  (r) = Recorded By, (t) = Taken By, (c) = Cosigned By      Initials Name Provider Type    MS Torsten Destiene R, PT, DPT, NCS Physical Therapist                   Clinical Impression       Row Name 11/21/23 0715          Pain    Pretreatment Pain Rating 2/10  numbness in B hands into the arms, B feets into toes  -MS     Posttreatment Pain Rating 2/10  -MS     Pain Location posterior  -MS     Pain Location - neck  -MS     Pain Intervention(s) Medication (See MAR);Repositioned;Ambulation/increased activity  -MS       Row Name 11/21/23 0715          Plan of Care Review    Plan of Care Reviewed With patient  -MS     Progress no change  -MS     Outcome Evaluation The patient presents alert and oriented x4 with aspen collar in place. She was educated on proper use and robbie/doff of aspen collar. She demonstrates mild weakness of the L lower leg and R hamstring with no sensation deficits. She has mild coordination deficits of her UEs and LEs that show proprioception deficits. She has a positive R lyons's. She ambulated in the hallway with a stiff midfoot strike gait pattern that she was able to progress to a heel strike pattern with cues. PT will continue  to work with her to improve her gait pattern, balance, and education. Recommened discharge home with assist.  -MS       Row Name 11/21/23 0715          Therapy Assessment/Plan (PT)    Patient/Family Therapy Goals Statement (PT) to go home  -MS     Rehab Potential (PT) good, to achieve stated therapy goals  -MS     Criteria for Skilled Interventions Met (PT) yes;meets criteria;skilled treatment is necessary  -MS     Therapy Frequency (PT) 2 times/day  -MS     Predicted Duration of Therapy Intervention (PT) until discharge  -MS       Row Name 11/21/23 0715          Positioning and Restraints    Post Treatment Position chair  -MS     In Chair sitting;call light within reach;encouraged to call for assist;with brace  -MS               User Key  (r) = Recorded By, (t) = Taken By, (c) = Cosigned By      Initials Name Provider Type    Destinee Lacey, PT, DPT, NCS Physical Therapist                   Outcome Measures       Row Name 11/21/23 0715 11/21/23 0500       How much help from another person do you currently need...    Turning from your back to your side while in flat bed without using bedrails? 3  -MS 4  -AM    Moving from lying on back to sitting on the side of a flat bed without bedrails? 3  -MS 4  -AM    Moving to and from a bed to a chair (including a wheelchair)? 3  -MS 4  -AM    Standing up from a chair using your arms (e.g., wheelchair, bedside chair)? 3  -MS 4  -AM    Climbing 3-5 steps with a railing? 3  -MS 4  -AM    To walk in hospital room? 3  -MS 4  -AM    AM-PAC 6 Clicks Score (PT) 18  -MS 24  -AM    Highest Level of Mobility Goal 6 --> Walk 10 steps or more  -MS 8 --> Walked 250 feet or more  -AM      Row Name 11/21/23 0715          Functional Assessment    Outcome Measure Options AM-PAC 6 Clicks Basic Mobility (PT)  -MS               User Key  (r) = Recorded By, (t) = Taken By, (c) = Cosigned By      Initials Name Provider Type    Destinee Lacey, PT, DPT, NCS Physical Therapist    AM  Alanna Tom, RN Registered Nurse                                 Physical Therapy Education       Title: PT OT SLP Therapies (In Progress)       Topic: Physical Therapy (In Progress)       Point: Mobility training (Done)       Learning Progress Summary             Patient Acceptance, E,TB,D, VU by MS at 11/21/2023 0735    Comment: role of PT in her care, spinal restrictions, use of aspen collar                         Point: Home exercise program (Not Started)       Learner Progress:  Not documented in this visit.              Point: Body mechanics (Not Started)       Learner Progress:  Not documented in this visit.              Point: Precautions (Done)       Learning Progress Summary             Patient Acceptance, E,TB,D, VU by MS at 11/21/2023 0735    Comment: role of PT in her care, spinal restrictions, use of aspen collar                                         User Key       Initials Effective Dates Name Provider Type Discipline    MS 07/11/23 -  Destinee Sarmiento, PT, DPT, NCS Physical Therapist PT                  PT Recommendation and Plan  Planned Therapy Interventions (PT): balance training, bed mobility training, gait training, patient/family education, orthotic fitting/training, neuromuscular re-education, motor coordination training, transfer training, stair training, strengthening  Plan of Care Reviewed With: patient  Progress: no change  Outcome Evaluation: The patient presents alert and oriented x4 with aspen collar in place. She was educated on proper use and robbie/doff of aspen collar. She demonstrates mild weakness of the L lower leg and R hamstring with no sensation deficits. She has mild coordination deficits of her UEs and LEs that show proprioception deficits. She has a positive R lyons's. She ambulated in the hallway with a stiff midfoot strike gait pattern that she was able to progress to a heel strike pattern with cues. PT will continue to work with her to improve her gait  pattern, balance, and education. Recommened discharge home with assist.     Time Calculation:         PT Charges       Row Name 11/21/23 0715             Time Calculation    Start Time 0715  -MS      Stop Time 0809  -MS      Time Calculation (min) 54 min  -MS      PT Received On 11/21/23  -MS      PT Goal Re-Cert Due Date 12/01/23  -MS         Untimed Charges    PT Eval/Re-eval Minutes 54  -MS         Total Minutes    Untimed Charges Total Minutes 54  -MS       Total Minutes 54  -MS                User Key  (r) = Recorded By, (t) = Taken By, (c) = Cosigned By      Initials Name Provider Type    Destinee Lacey, PT, DPT, NCS Physical Therapist                      PT G-Codes  Outcome Measure Options: AM-PAC 6 Clicks Basic Mobility (PT)  AM-PAC 6 Clicks Score (PT): 18  AM-PAC 6 Clicks Score (OT): 20  PT Discharge Summary  Anticipated Discharge Disposition (PT): home with assist    Destinee Sarmiento, PT, DPT, NCS  11/21/2023

## 2023-11-21 NOTE — PLAN OF CARE
Problem: Adult Inpatient Plan of Care  Goal: Plan of Care Review  Outcome: Ongoing, Progressing  Flowsheets (Taken 11/21/2023 0511)  Progress: improving  Plan of Care Reviewed With: patient  Outcome Evaluation: Pt A&Ox4, VSS. Pt c/o generalized soreness to neck/shoulders, has denied need for pain medications when offered. Up with stand by assist to bathroom, pt voiding. Dressing to neck CDI, no swelling or discoloration. Cervical collar in place. Room air, ETCO2 and CPOX. SCDs. Reports N/T to both hands and feet, pt states this is baseline for her. Safety maintained, will continue to monitor.

## 2023-11-21 NOTE — THERAPY DISCHARGE NOTE
Acute Care - Occupational Therapy Discharge Summary  [facility]     Patient Name: Hina Trevino  : 1960  MRN: [id number]    Today's Date: 2023  Onset of Illness/Injury or Date of Surgery: 23       Referring Physician: Dr. Villanueva      Admit Date: 2023        OT Recommendation and Plan    Visit Dx:    ICD-10-CM ICD-9-CM   1. Cervical myelopathy  G95.9 721.1   2. Impaired mobility [Z74.09]  Z74.09 799.89                OT Rehab Goals       Row Name 23 1500             Dressing Goal 1 (OT)    Activity/Device (Dressing Goal 1, OT) upper body dressing  -LR      [place]/Cues Needed (Dressing Goal 1, OT) independent  -LR      Time Frame (Dressing Goal 1, OT) long term goal (LTG);by discharge  -LR      Strategies/Barriers (Dressing Goal 1, OT) to include c-collar  -LR      Progress/Outcome (Dressing Goal 1, OT) goal not met  -LR         Toileting Goal 1 (OT)    Activity/Device (Toileting Goal 1, OT) toileting skills, all  -LR      [place] Level/Cues Needed (Toileting Goal 1, OT) independent  -LR      Time Frame (Toileting Goal 1, OT) long term goal (LTG);by discharge  -LR      Progress/Outcome (Toileting Goal 1, OT) goal not met  -LR         Problem Specific Goal 1 (OT)    Problem Specific Goal 1 (OT) Pt will provide teachback of 3/3 spinal precautions and demonstrate proper c-collar fitting/mgt/wear schedule 100% of the time.  -LR      Time Frame (Problem Specific Goal 1, OT) long term goal (LTG);by discharge  -LR      Progress/Outcome (Problem Specific Goal 1, OT) goal not met  -LR                User Key  (r) = Recorded By, (t) = Taken By, (c) = Cosigned By      Initials Name Provider Type Discipline    LR Mag Macias OTR/L Occupational Therapist OT                                OT Discharge Summary  Anticipated Discharge Disposition (OT): home with assist  Reason for Discharge: Discharge from facility  Outcomes Achieved: Refer to plan of care for updates on goals  achieved  Discharge Destination: Home with assist      Mag Macias OTR/L  11/21/2023

## 2023-11-21 NOTE — PLAN OF CARE
Goal Outcome Evaluation:   Pt alert and oriented x4. VSS. No c/o pain. MCCOY. PPP. SCDS for VTE. . Tolerating Regular diet. Skin intact except for incision. Voiding via toilet.  Last BM 11-. Has D/c plans today. DC papers gone over carefully bonnie use of Aspen collar and meds. . Call light within reach. Safety maintained.

## 2023-11-21 NOTE — PLAN OF CARE
Goal Outcome Evaluation:  Plan of Care Reviewed With: patient        Progress: no change  Outcome Evaluation: The patient presents alert and oriented x4 with aspen collar in place. She was educated on proper use and robbie/doff of aspen collar. She demonstrates mild weakness of the L lower leg and R hamstring with no sensation deficits. She has mild coordination deficits of her UEs and LEs that show proprioception deficits. She has a positive R lyons's. She ambulated in the hallway with a stiff midfoot strike gait pattern that she was able to progress to a heel strike pattern with cues. PT will continue to work with her to improve her gait pattern, balance, and education. Recommened discharge home with assist.      Anticipated Discharge Disposition (PT): home with assist

## 2023-11-21 NOTE — CONSULTS
Consult Note    Referring Provider: Dr. Villanueva  Reason for Consultation: Medical management    Patient Care Team:  Olamide Castro APRN as PCP - General (Nurse Practitioner)    Chief complaint chronic neck pain    Subjective .     History of present illness:  The patient presents today for surgical correction after failing conservative management.  Outpatient work-up has been reviewed through provided outpatient notes per attending.  They have been through anti-inflammatories, muscle relaxers, and pain medication.  The pain has progressed to the point in time where it is affecting their activities of daily living and after being explained all their options, elected to undergo surgical correction.  Patient with radicular neck pain that has not responded to conservative measures.    Their primary care physician does not attend here at [facility]; therefore, I have been asked to take care of their primary medical needs in the perioperative period.  The postoperative pain is as expected.  Generalized pain in shoulders and bilateral arms with neck currently in collar.    There are no other precipitating or relieving factors. I have been requested by the Attending Physician to provide Medical Consultation in the perioperative period. The patient understands my role in their hospitalization and agrees with my treatment plan. They understand the importance of follow up with their PCP upon discharge  from [facility] for any concerns or abnormalities.  All questions were encouraged and answered to the best of my ability.       REVIEW OF SYSTEMS:    CONSTITUTIONAL:  Negative for anorexia, chills, fevers, night sweats and weight loss  EYES:  negative for eye dryness, icterus and redness  HEENT:   negative for dental problems, epistaxis, facial trauma and thrush  RESPIRATORY:  negative for chest tightness, cough, dyspnea on exertion, pneumonia and sputum  CARDIOVASCULAR: negative for chest pain,  dyspnea, exertional chest pressure/discomfort, irregular heart beat, palpitations, paroxysmal nocturnal dyspnea and syncope  GASTROINTESTINAL:  negative for abdominal pain, hematemesis, jaundice, melena and rectal bleeding. No significant changes in bowel habits preoperatively.   MUSCULOSKELETAL:  negative for muscle weakness, myalgias and neck pain, outside of surgical issues noted above  NEUROLOGICAL:   negative for dizziness, headaches, seizures, speech problems, tremors and vertigo  INTEGUMENT: negative for pruritus, rash, skin color change and skin lesion(s)         History    Past Medical History:   Diagnosis Date    Anxiety     Arthritis     Depression     Hypertension     Restless leg syndrome      Past Surgical History:   Procedure Laterality Date    COLONOSCOPY      ENDOSCOPY      INCISION AND DRAINAGE HIP Left     JOINT REPLACEMENT      TOTAL HIP ARTHROPLASTY Left     dr cuadra in [facility]     History reviewed. No pertinent family history.  Social History     Tobacco Use    Smoking status: Former     Packs/day: 1.00     Years: 50.00     Additional pack years: 0.00     Total pack years: 50.00     Types: Cigarettes     Quit date:      Years since quittin.8    Smokeless tobacco: Never   Vaping Use    Vaping Use: Never used   Substance Use Topics    Alcohol use: Yes     Alcohol/week: 2.0 standard drinks of alcohol     Types: 1 Glasses of wine, 1 Shots of liquor per week     Comment: MAYBE ONCE A MONTH    Drug use: Yes     Frequency: 7.0 times per week     Types: Marijuana     Comment: DAILY     Medications Prior to Admission   Medication Sig Dispense Refill Last Dose    amLODIPine (NORVASC) 5 MG tablet Take 1 tablet by mouth Daily.   2023 at 1000    Biotin 40780 MCG tablet Take 1 tablet by mouth Daily.   2023 at 1000    Calcium Carb-Cholecalciferol (CALCIUM 500 + D3 PO) Take 1 tablet by mouth Daily. This is a gummy   2023 at 1000    cetirizine (zyrTEC) 10 MG tablet Take 1  tablet by mouth Daily.   11/19/2023 at 1000    lamoTRIgine (LaMICtal) 100 MG tablet Take 2 tablets by mouth Daily.   11/19/2023 at 1000    losartan (COZAAR) 50 MG tablet Take 1 tablet by mouth Daily.   11/19/2023    meloxicam (MOBIC) 15 MG tablet Take 1 tablet by mouth Daily.   11/19/2023 at 1000    Multiple Vitamins-Minerals (WOMENS 50+ MULTI VITAMIN PO) Take 1 tablet by mouth Daily.   11/19/2023 at 1000    rOPINIRole (REQUIP) 0.5 MG tablet Take 1 tablet by mouth Every Night. Takes at 1700   11/19/2023 at 1800       Allergies:  Phenergan [promethazine]    Objective     Vital Signs   Temp:  [97.5 °F (36.4 °C)-100.3 °F (37.9 °C)] 97.9 °F (36.6 °C)  Heart Rate:  [] 94  Resp:  [12-20] 20  BP: (124-153)/(64-81) 124/81          Physical Exam:  Constitutional: oriented to person, place, and time. appears well-developed.   Head: Normocephalic and atraumatic.   Eyes: Pupils are equal, round, and reactive to light.  No icterus or erythema  Neck: In collar with bandage in place no drainage..  Without masses or carotid bruit  Cardiovascular: Regular rhythm and normal heart sounds.  No significant lift, rub or murmur noted  Pulmonary/Chest: Effort normal and breath sounds normal. CTAB, encourage deep breathing.  Abdominal: Soft. Bowel sounds are normal to hypoactive. No significant distension. There is no rebound and no guarding.   Musculoskeletal: Normal range of motion and no edema or tenderness outside of surgical area.   Neurological: Pt is alert and oriented to person, place, and time.  normal reflexes present.  Somewhat sedated from anesthesia and pain medicine noted  Skin: Skin is warm and dry.  No new rashes of concern.    Results Review:   I reviewed the patient's new imaging results and agree with the interpretation.      Assessment & Plan       Cervical myelopathy    Primary hypertension    GERD without esophagitis    Drug induced constipation      Cervical neck pain chronic-postop day #1 from cervical fusion.   Pain as expected in bilateral shoulders and neck.  Pain well controlled.    Hypertension-review pre and post BP's,will restart home BP meds when BP allows. Recent studies demonstrate the need for patience and less reactivity for precipitous drop of BP in the acute care setting. Will educate staff to use other modalities to help reduce MAP prior to adding additional medical interventions. Add clonidine 0.1 mg every 4 hours prn if bp> 140/90,monitor BP and adjust meds as necessary.     Constipation-educate patient about the ill effects of anesthesia and narcotic pain medication on the normal peristalsis of the gut.  Encourage judicious use of pain medication and early ambulation to aid in bowel functioning returning to normal.  We will start with MiraLAX 1 capful BID until BM, then decrease to 1 x a day, then can step up to a prokinetic which can be used for opioid-induced constipation, and ultimately end up with Relistor 12 mcg subq every 48 hours to block the effect of narcotics on the gut.  Our plan is to soften the stools so after surgical intervention if stimulation is needed with magnesium citrate and or Dulcolax suppository the stool will move much easier.  Encourage water consumption to help soften the stool as well.     GERD-exacerbated by pain medications and anesthesia, will add PPI and or H2 blocker as needed and can step up to Carafate 1 gm po before each meal and nightly. Patient educated about smaller portions with more frequent feedings. Patient also instructed on early mobilization to help with return of normal peristalsis of gut.           I discussed the patient's findings and my recommendations with patient, nursing staff, and consulting provider    Giles Roberts MD  11/21/23  07:05 CST

## 2023-11-21 NOTE — DISCHARGE INSTR - ACTIVITY
Activity: Avoid bending lifting or twisting, no driving while taking narcotic pain medication, walk 15 minutes 4 times daily  Diet: regular diet  Wound Care: keep wound clean and dry  Other: Contact [facility] office with questions or concerns

## 2023-11-21 NOTE — DISCHARGE SUMMARY
[facility]  SPINE SURGERY  MILLY Delong  DISCHARGE SUMMARY  Patient ID:  Hina Trevino  [id number]  63 y.o.  1960    Admit date: 11/20/2023    Discharge date and time: 11/21/2023    Admitting Physician: LARISSA Villanueva MD     Indication for Admission: Planned surgical admission     Admission Diagnoses: Cervical myelopathy [G95.9]    Discharge Diagnoses: Cervical myelopathy [G95.9]    Procedures: ACDF C3-7, corpectomy C6    Problem List:   Cervical myelopathy    Primary hypertension    GERD without esophagitis    Drug induced constipation      Consults:  Family Medicine     Admission Condition: stable    Discharged Condition: stable    Hospital Course: no immediate post operative complication     Disposition: home    Patient Instructions:      Discharge Medications        ASK your doctor about these medications        Instructions Start Date   amLODIPine 5 MG tablet  Commonly known as: NORVASC   5 mg, Oral, Daily      Biotin 55368 MCG tablet   1 tablet, Oral, Daily      CALCIUM 500 + D3 PO   1 tablet, Oral, Daily, This is a gummy      cetirizine 10 MG tablet  Commonly known as: zyrTEC   10 mg, Oral, Daily      lamoTRIgine 100 MG tablet  Commonly known as: LaMICtal   200 mg, Oral, Daily      losartan 50 MG tablet  Commonly known as: COZAAR   50 mg, Oral, Daily      meloxicam 15 MG tablet  Commonly known as: MOBIC   15 mg, Oral, Daily      rOPINIRole 0.5 MG tablet  Commonly known as: REQUIP   0.5 mg, Oral, Nightly, Takes at 1700      WOMENS 50+ MULTI VITAMIN PO   1 tablet, Oral, Daily             Activity: Avoid bending lifting or twisting, no driving while taking narcotic pain medication, walk 15 minutes 4 times daily  Diet: regular diet  Wound Care: keep wound clean and dry  Other: Contact [facility] office with questions or concerns    Follow-up with Dr Villanueva or PA's in 2 weeks.    Electronically signed by MILLY Delong 07:45 CST  11/21/2023

## 2023-11-21 NOTE — PAYOR COMM NOTE
"Ref:  [id number]     [facility]  [place],  [phone]  or  fax   [phone]      Kirt Burch (63 y.o. Female)       Date of Birth   1960    Social Security Number       Address   [address] [place] [address]    Home Phone   [phone]    MRN   [id number]       Pentecostal   Zoroastrian    Marital Status                               Admission Date   11/20/23    Admission Type   Elective    Admitting Provider   LARISSA Villanueva MD    Attending Provider   LARISSA Villanueva MD    Department, Room/Bed   [facility] 3A, 337/1       Discharge Date       Discharge Disposition       Discharge Destination                                 Attending Provider: LARISSA Villanueva MD    Allergies: Phenergan [Promethazine]    Isolation: None   Infection: None   Code Status: CPR    Ht: 158 cm (62.21\")   Wt: 61.6 kg (135 lb 12.9 oz)    Admission Cmt: None   Principal Problem: Cervical myelopathy [G95.9]                   Active Insurance as of 11/20/2023       Primary Coverage       Payor Plan Insurance Group Employer/Plan Group    MEDICARE MEDICARE A & B        Payor Plan Address Payor Plan Phone Number Payor Plan Fax Number Effective Dates    [address] [phone]  5/1/2023 - None Entered    [facility] [address]         Subscriber Name Subscriber Birth Date Member ID       KIRT BURCH 1960 [id number]               Secondary Coverage       Payor Plan Insurance Group Employer/Plan Group    [place]        Payor Plan Address Payor Plan Phone Number Payor Plan Fax Number Effective Dates    [address] [address] [phone]  9/23/2020 - None Entered    [facility] [address]         Subscriber Name Subscriber Birth Date Member ID       KIRT BURCH 1960 [id number]                     Emergency Contacts        (Rel.) Home Phone Work Phone Mobile Phone    CHANCE BURCH (Son) -- -- [phone]               "   History & Physical        LARISSA Villanueva MD at 11/20/23 0654          H&P reviewed. The patient was examined and there are no changes to the H&P.    Electronically signed by LARISSA Villanueva MD at 11/20/23 0654   Source Note        [Media Unavailable] Scan on 11/15/2023 1223 by [employer], [place]: CERVICAL SPINE HISTORY AND PHYSICAL, [facility], 11/20/2023          Electronically signed by [employer], [employer] at 11/15/23 1138                 H&P signed by [employer], [employer] at 11/15/23 1138         [Media Unavailable] Scan on 11/15/2023 1223 by [employer], [place]: CERVICAL SPINE HISTORY AND PHYSICAL, [facility], 11/20/2023          Electronically signed by [employer], [place] at 11/15/23 1138          Operative/Procedure Notes (last 48 hours)        LARISSA Villanueva MD at 11/20/23 0654          CERVICAL CORPECTOMY, CERVICAL DISCECTOMY ANTERIOR FUSION WITH INSTRUMENTATION  Procedure Note    Hina Trevino  11/20/2023    Pre-op Diagnosis:     1. Cervical spondylotic myelopathy.  2. Bilateral shoulder and arm radiculopathy, left worse than right.   3. Bilateral upper extremity weakness.   4. Mild myelopathic gait.   5. Severe degenerative disc disease C3 to C7.   6. Degenerative cervical scoliosis, concave left C3 to C7.   7. Chronic central disc herniation C5-6.   8. Severe central and foraminal stenosis C4 to C7.   9. Myelomalacia C6.   10. Mild bilateral foraminal stenosis C3-4.   11. Status post irrigation and debridement, infected left total hip arthroplasty.     Post-op Diagnosis:    same    Procedure/CPT® Codes:    1.  Corpectomy C6  2.  Partial corpectomy C5  3.  Anterior cervical discectomy with interbody fusion C3-4, C4-5  4.  Anterior interbody fusion C5-6, C6-7  5.  Anterior spinal instrumentation C3-4, C4-7 (ATE anterior plate and screws x 2)  6.  Use of interbody biomechanical device for fusion C3-4, C4-5, C5-7 (Uwbwyj7t spacer x 2, DePuy corpectomy spacer)  7.  Use of locally obtained  autograft bone for fusion  8.  Use of allograft bone matrix for fusion (OsteoAmp)  9.  Use of operating microscope for decompression and microdissection  10.  Use of fluoroscopy for confirmation of surgical level, placement of instrumentation and interbody spacers  11.  Intraoperative neural monitoring    Anesthesia: General    Surgeon: GUERO Villanueva MD    Assistant: Jose Alberto Blackmon PA-C    Estimated Blood Loss: 100 mL    Complications: None    Condition: Stable to PACU.    Indications:    The patient is a 63-year-old who sees Olamide Castro nurse practitioner for medical issues.  She presented to the office with signs and symptoms consistent with cervical spondylotic myelopathy.  She was noted to have bilateral shoulder and arm radiculopathy, the left side worse than the right, bilateral upper extremity weakness, as well as a myelopathic gait.  Imaging studies revealed severe degenerative disc disease from C3-7 associated with a degenerative scoliosis concave to the left.  She was noted to have a chronic central disc herniation at C5-6 and myelomalacia at C6.  The stenosis was most severe from C4-7.    After failing conservative measures, it was mutually decided that surgery would be the best option.  Risks, benefits, and complications of surgery were discussed with the patient.  The patient appeared well informed and wished to proceed.  We specifically discussed the risks of infection, blood loss, nerve root injury, CSF leak, spinal cord injury, and the possibility of incomplete resolution of symptoms.  We also discussed the possible risk of a nonunion and the potential need for additional surgery in the event of a pseudoarthrosis or hardware failure.    Operative Procedure:    After obtaining informed consent and verifying the correct operative levels, the patient was brought to the operating room and placed supine on an operating table.  A general anesthetic was provided by the anesthesia service with the  assistance of an endotracheal tube.  Once this was properly positioned and secured, a bump was placed under the patient's shoulders placing the neck into a gentle extended position.  Head halter traction was then used with 10 pounds weight to maintain head position.  The shoulders were taped using 3 inch wide cloth tape to assist with radiographic visualization.  Fluoroscopy was used to identify the disc spaces from C3-7, and the skin was marked for our planned incision.  The anterior neck region was then prepped and draped in usual sterile fashion.  A surgical timeout was taken to confirm this was the correct patient, we are working at the correct levels, and that preoperative antibiotics were given in a timely fashion.    A transverse incision was then created over the anterior cervical region using a 10 blade scalpel directly centered over the levels of interest.  Dissection was carried sharply to subcutaneous tissues.  The platysma was divided in line with the incision and blunt dissection was carried along the medial border of the sternocleidomastoid muscle, lateral to the strap musculature the neck.  The carotid pulse was then palpated, and dissection was carried medial to the carotid sheath and lateral to the trachea and esophagus.  Handheld Cloward retractors along with Kitners were used to dissect through pretracheal and prevertebral fascia.  A radiographic marker was placed into one of the disc spaces and fluoroscopy was used to confirm that we are indeed at the correct levels.  The longus coli muscles were then elevated from either side of the spine using Bovie cautery.    An initial discectomy was started by creating an annulotomy with Bovie cautery.  A Wooten elevator was used to remove some of the disc material off of the endplates.  Disc material was initially removed using forward angled curettes and pituitaries.  Some anterior osteophytes were removed using a rongeur.  All retrieved bone was cleaned,  morcellized and saved for later packing into the disc spaces to assist with obtaining a fusion.  [place] pins were then placed to allow gentle distraction across the disc spaces.  The microscope was then positioned for the microdissection and decompression portion of the procedure.    I started at the C3-4 and C4-5 disc spaces using Kerrisons to remove remaining anterior osteophytes off of the endplates.  Straight curettes were used to remove the cartilaginous endplates and all remaining disc material.  The high-speed bur was then used to remove posterior osteophytes and to contour the endplates in preparation for fusion.  A forward angled curette was used to free up the posterior margins of the vertebral bodies, releasing the posterior longitudinal ligament.  Posterior osteophytes, posterior disc material, and the PLL were all resected using Kerrisons.  Kerrisons were also used to perform a bilateral neural foraminotomy.  At the end of the discectomy decompression, the central spinal canal and the exiting nerve roots at both C3-4 and C4-5 appeared to be free of compression.  Bleeding in the epidural space was controlled using FloSeal.  The wound was then copiously irrigated with saline solution and attention was turned to C5-6 and C6-7.    Given the severe central stenosis as well as the advanced degenerative changes at both C5-6 and C6-7, it was felt necessary to perform a corpectomy of C6 in order to adequately and safely decompress the central spinal canal.  After initially preparing the disc spaces previously, I used a rongeur to remove the central portion of the C6 vertebral body.  All retrieved bone was saved for later packing back into spacers to assist with obtaining a fusion.  The rongeur was used as posterior as safely possible, leaving a thin shelf of posterior bone.  I used a forward angled curettes to release this as much as possible and then resected it with Kerrisons.  The curettes were used to  release the posterior longitudinal ligament and this was resected using Kerrisons as well revealing the central spinal canal.  Kerrisons were used to remove osteophytes from the superior endplate of C7 and to perform a neural foraminotomy of the exiting nerve roots at that area.  Bleeding in the epidural space was controlled using FloSeal.    There was still very severe stenosis at the posterior aspect of the endplate inferiorly of C5.  I tried to decompress this area with Kerrisons and curettes, but it was felt that a partial corpectomy would be necessary to more safely decompress this area.  Using a combination of Kerrisons, the high-speed bur, and the rongeur, I removed approximately 50% of the C5 vertebral body inferiorly.  This allowed much more visualization to safely decompress the central spinal canal.  I then performed a bilateral neural foraminotomy with Kerrisons of the exiting nerve roots at what was the C5-6 disc space.  Again bleeding was controlled using FloSeal.  The wound was copiously irrigated with saline solution.  At the end of the decompression involving the corpectomy of C6 and the partial corpectomy of C5, the central spinal canal and all exiting nerve roots were free of compression.    The calipers were then used to measure the corpectomy defect from the superior endplate of C7 up to the remaining portion of C5.  An appropriately sized corpectomy cage from the [employer] instrumentation set with the larger footprint was then packed as tightly as possible with locally obtained autograft bone mixed with an allograft matrix called OsteoAmp.  This PEEK corpectomy cage was then tapped into position spanning the corpectomy defect from the remaining portion of C5 down to C7.  This spacer was placed as an interbody biomechanical device to assist with fusion.    Next, trial spacers were tapped into position into the disc spaces of C3-4 and C4-5.  Once the appropriate size was determined, an actual spacer  from [employer] matching the same size as the trial was delivered to the sterile field for each space.  The spacers were packed as tightly as possible with locally obtained autograft bone also mixed with an allograft bone matrix called OsteoAmp.  The bases were then malleted into position into the disc spaces of C3-4 and C4-5 under fluoroscopic guidance.  They were placed as interbody biomechanical devices to assist with fusion.    After confirming the interbody spacers were properly positioned with fluoroscopy, the [place] pins were removed.  Remaining anterior osteophytes were contoured off of the vertebral bodies using a combination of the high-speed bur and the Rongeur to allow for the best possible plate fixation.  An anterior plate from the [facility] instrumentation set was then chosen to span C4 to 7.  This was held into position using a series of screws.  I placed 2 screws into C4, 2 screws into the remaining vertebral body of C5, as well as 2 screws into C7 for a total of 6 screws.  I then placed a second anterior plate from the [facility] instrumentation set to span C3-4 individually.  This was held into position using 2 screws into C3 and 2 screws into C4.    Final fluoroscopy imaging confirmed adequate position of the plates and screws as well as the interbody spacers.  All implants appeared to be properly positioned with good maintenance of cervical alignment.  A final inspection of the operative field was then undertaken to ensure that we had adequate hemostasis.  Bleeding at this point was controlled with FloSeal and bipolar cautery.    Closure was accomplished by reapproximating the platysma with a 3-0 Vicryl.  Immediate subcutaneous tissues were reapproximated with a 4-0 Vicryl in a buried fashion.  Final skin closure was accomplished with Mastisol and Steri-Strips.  The wound was then washed and a sterile Bioclusive dressing was applied.  The patient was then placed into a hard cervical collar, extubated, and  sent to the recovery room in good stable condition.    The patient tolerated the procedure well.  There were no complications.  We estimated blood loss to be 100 mL.  Intraoperative neural monitoring was used during the procedure.  We used motor evoked potentials, EEG, TOF, free run EMG, and SSEPs.  There were no neuro monitoring signal changes during the procedure.    Jose Alberto Blackmon PA-C provided critical assistance during the procedure.  His assistance was medically necessary in order to allow the procedure to occur in the most safe and efficient manner.  He assisted with not only patient positioning and wound closure, but more importantly with retraction of delicate neurovascular structures, assistance during the corpectomy and discectomy decompressions, as well as the placement of the interbody spacers and instrumentation to obtain a fusion.    GUERO Villanueva MD     Date: 11/20/2023  Time: 11:04 CST    Electronically signed by LARISSA Villanueva MD at 11/20/23 8467

## 2023-11-21 NOTE — THERAPY DISCHARGE NOTE
Acute Care - Physical Therapy Discharge Summary  [facility]       Patient Name: Hina Trevino  : 1960  MRN: [id number]    Today's Date: 2023  Onset of Illness/Injury or Date of Surgery: 23       Referring Physician: Dr. Villanueva      Admit Date: 2023      PT Recommendation and Plan    Visit Dx:    ICD-10-CM ICD-9-CM   1. Cervical myelopathy  G95.9 721.1   2. Impaired mobility [Z74.09]  Z74.09 799.89            PT Charges       Row Name 2315             Time Calculation    Start Time 0715  -MS      Stop Time 0809  -MS      Time Calculation (min) 54 min  -MS      PT Received On 23  -MS      PT Goal Re-Cert Due Date 23  -MS         Untimed Charges    PT Eval/Re-eval Minutes 54  -MS         Total Minutes    Untimed Charges Total Minutes 54  -MS       Total Minutes 54  -MS                User Key  (r) = Recorded By, (t) = Taken By, (c) = Cosigned By      Initials Name Provider Type    Destinee Lacey, PT, DPT, NCS Physical Therapist                     PT Rehab Goals       Row Name 23 1544 2315          Bed Mobility Goal 1 (PT)    Activity/Assistive Device (Bed Mobility Goal 1, PT) bed mobility activities, all  -NW bed mobility activities, all  -MS     [place] Level/Cues Needed (Bed Mobility Goal 1, PT) independent  -NW independent  -MS     Time Frame (Bed Mobility Goal 1, PT) long term goal (LTG);by discharge  -NW long term goal (LTG);by discharge  -MS     Progress/Outcomes (Bed Mobility Goal 1, PT) goal not met  -NW new goal  -MS        Transfer Goal 1 (PT)    Activity/Assistive Device (Transfer Goal 1, PT) sit-to-stand/stand-to-sit;bed-to-chair/chair-to-bed  -NW sit-to-stand/stand-to-sit;bed-to-chair/chair-to-bed  -MS     [place]abella Level/Cues Needed (Transfer Goal 1, PT) independent  -NW independent  -MS     Time Frame (Transfer Goal 1, PT) long term goal (LTG);by discharge  -NW long term goal (LTG);by discharge  -MS     Progress/Outcome  (Transfer Goal 1, PT) goal not met  -NW new goal  -MS        Gait Training Goal 1 (PT)    Activity/Assistive Device (Gait Training Goal 1, PT) gait (walking locomotion);decrease fall risk;increase endurance/gait distance;improve balance and speed  -NW gait (walking locomotion);decrease fall risk;increase endurance/gait distance;improve balance and speed  -MS     [place] Level (Gait Training Goal 1, PT) independent  -NW independent  -MS     Distance (Gait Training Goal 1, PT) 300ft with heel strike 75% of the time B  -NW 300ft with heel strike 75% of the time B  -MS     Time Frame (Gait Training Goal 1, PT) long term goal (LTG);by discharge  -NW long term goal (LTG);by discharge  -MS     Progress/Outcome (Gait Training Goal 1, PT) goal not met  -NW new goal  -MS        Stairs Goal 1 (PT)    Activity/Assistive Device (Stairs Goal 1, PT) ascending stairs;descending stairs;using handrail, right;step-to-step  -NW ascending stairs;descending stairs;using handrail, right;step-to-step  -MS     [place] Level/Cues Needed (Stairs Goal 1, PT) modified independence  -NW modified independence  -MS     Number of Stairs (Stairs Goal 1, PT) 4  -NW 4  -MS     Time Frame (Stairs Goal 1, PT) long term goal (LTG);by discharge  -NW long term goal (LTG);by discharge  -MS     Progress/Outcome (Stairs Goal 1, PT) goal not met  -NW new goal  -MS               User Key  (r) = Recorded By, (t) = Taken By, (c) = Cosigned By      Initials Name Provider Type Discipline    Destinee Lacey R, PT, DPT, NCS Physical Therapist PT    NW Marsha Connor PTA Physical Therapist Assistant PT                        PT Discharge Summary  Anticipated Discharge Disposition (PT): home  Reason for Discharge: Discharge from facility  Outcomes Achieved: Discharge from facility occurred on same date as evluation  Discharge Destination: Home      Marsha Connor PTA   11/21/2023

## 2023-11-22 NOTE — PAYOR COMM NOTE
"REF:   [id number]     [facility]  FAX  [phone]    Kirt Burch (63 y.o. Female)       Date of Birth   1960    Social Security Number       Address   [address] [place] [address]    Home Phone   [phone]    MRN   [id number]       Mormon   Faith    Marital Status                               Admission Date   11/20/23    Admission Type   Elective    Admitting Provider   LARISSA Villanueva MD    Attending Provider       Department, Room/Bed   [facility] 3A, 337/1       Discharge Date   11/21/2023    Discharge Disposition   Home or Self Care    Discharge Destination                                 Attending Provider: (none)   Allergies: Phenergan [Promethazine]    Isolation: None   Infection: None   Code Status: Prior    Ht: 158 cm (62.21\")   Wt: 61.6 kg (135 lb 12.9 oz)    Admission Cmt: None   Principal Problem: Cervical myelopathy [G95.9]                   Active Insurance as of 11/20/2023       Primary Coverage       Payor Plan Insurance Group Employer/Plan Group    MEDICARE MEDICARE A & B        Payor Plan Address Payor Plan Phone Number Payor Plan Fax Number Effective Dates    [address] [phone]  5/1/2023 - None Entered    [facility] [address]         Subscriber Name Subscriber Birth Date Member ID       KIRT BURCH 1960 [id number]               Secondary Coverage       Payor Plan Insurance Group Employer/Plan Group    [place]        Payor Plan Address Payor Plan Phone Number Payor Plan Fax Number Effective Dates    [address] [address] [phone]  9/23/2020 - None Entered    [facility] [address]         Subscriber Name Subscriber Birth Date Member ID       KIRT BURCH 1960 [id number]                     Emergency Contacts        (Rel.) Home Phone Work Phone Mobile Phone    CHANCE BURCH (Son) -- -- [phone]                 Discharge Summary        Lorri, " MILLY Rucker at 11/21/23 0744          [facility]  SPINE SURGERY  MILLY Delong  DISCHARGE SUMMARY  Patient ID:  Hina Trevino  [id number]  63 y.o.  1960    Admit date: 11/20/2023    Discharge date and time: 11/21/2023    Admitting Physician: LARISSA Villanueva MD     Indication for Admission: Planned surgical admission     Admission Diagnoses: Cervical myelopathy [G95.9]    Discharge Diagnoses: Cervical myelopathy [G95.9]    Procedures: ACDF C3-7, corpectomy C6    Problem List:   Cervical myelopathy    Primary hypertension    GERD without esophagitis    Drug induced constipation      Consults:  Family Medicine     Admission Condition: stable    Discharged Condition: stable    Hospital Course: no immediate post operative complication     Disposition: home    Patient Instructions:      Discharge Medications        ASK your doctor about these medications        Instructions Start Date   amLODIPine 5 MG tablet  Commonly known as: NORVASC   5 mg, Oral, Daily      Biotin 17719 MCG tablet   1 tablet, Oral, Daily      CALCIUM 500 + D3 PO   1 tablet, Oral, Daily, This is a gummy      cetirizine 10 MG tablet  Commonly known as: zyrTEC   10 mg, Oral, Daily      lamoTRIgine 100 MG tablet  Commonly known as: LaMICtal   200 mg, Oral, Daily      losartan 50 MG tablet  Commonly known as: COZAAR   50 mg, Oral, Daily      meloxicam 15 MG tablet  Commonly known as: MOBIC   15 mg, Oral, Daily      rOPINIRole 0.5 MG tablet  Commonly known as: REQUIP   0.5 mg, Oral, Nightly, Takes at 1700      WOMENS 50+ MULTI VITAMIN PO   1 tablet, Oral, Daily             Activity: Avoid bending lifting or twisting, no driving while taking narcotic pain medication, walk 15 minutes 4 times daily  Diet: regular diet  Wound Care: keep wound clean and dry  Other: Contact [facility] office with questions or concerns    Follow-up with Dr Villanueva or PA's in 2 weeks.    Electronically  signed by MILLY Delong 07:45 CST 11/21/2023      Electronically signed by Ace Blackmon PA at 11/21/23 0745       Discharge Order (From admission, onward)       Start     Ordered    11/21/23 0748  Discharge patient  Once        Expected Discharge Date: 11/21/23   Expected Discharge Time: Afternoon   Discharge Disposition: Home or Self Care   Physician of Record for Attribution - Please select from Treatment Team: LARISSA HOFFMAN [[id number]]   Review needed by CMO to determine Physician of Record: No      Question Answer Comment   Physician of Record for Attribution - Please select from Treatment Team LARISSA HOFFMAN    Review needed by CMO to determine Physician of Record No        11/21/23 0748

## 2024-01-08 ENCOUNTER — TRANSCRIBE ORDERS (OUTPATIENT)
Dept: GENERAL RADIOLOGY | Facility: HOSPITAL | Age: 64
End: 2024-01-08
Payer: MEDICARE

## 2024-01-08 DIAGNOSIS — Z98.1 ARTHRODESIS STATUS: Primary | ICD-10-CM

## 2024-01-09 ENCOUNTER — HOSPITAL ENCOUNTER (OUTPATIENT)
Dept: GENERAL RADIOLOGY | Facility: HOSPITAL | Age: 64
Discharge: HOME | End: 2024-01-09
Admitting: ORTHOPAEDIC SURGERY
Payer: MEDICARE

## 2024-01-09 DIAGNOSIS — Z98.1 ARTHRODESIS STATUS: ICD-10-CM

## 2024-01-09 PROCEDURE — 72040 X-RAY EXAM NECK SPINE 2-3 VW: CPT

## 2024-02-12 ENCOUNTER — TRANSCRIBE ORDERS (OUTPATIENT)
Dept: GENERAL RADIOLOGY | Facility: HOSPITAL | Age: 64
End: 2024-02-12
Payer: MEDICARE

## 2024-02-12 DIAGNOSIS — Z98.1 ARTHRODESIS STATUS: Primary | ICD-10-CM

## 2024-02-19 ENCOUNTER — TRANSCRIBE ORDERS (OUTPATIENT)
Dept: ADMINISTRATIVE | Facility: HOSPITAL | Age: 64
End: 2024-02-19
Payer: MEDICARE

## 2024-02-19 DIAGNOSIS — Z98.1 STATUS POST CERVICAL SPINAL FUSION: Primary | ICD-10-CM

## 2024-03-12 ENCOUNTER — HOSPITAL ENCOUNTER (OUTPATIENT)
Dept: GENERAL RADIOLOGY | Facility: HOSPITAL | Age: 64
Discharge: HOME | End: 2024-03-12
Admitting: PHYSICIAN ASSISTANT
Payer: MEDICARE

## 2024-03-12 DIAGNOSIS — Z98.1 STATUS POST CERVICAL SPINAL FUSION: ICD-10-CM

## 2024-03-12 PROCEDURE — 72050 X-RAY EXAM NECK SPINE 4/5VWS: CPT

## (undated) DEVICE — ENDO KIT,LOURDES HOSPITAL: Brand: MEDLINE INDUSTRIES, INC.